# Patient Record
Sex: MALE | Race: WHITE | HISPANIC OR LATINO
[De-identification: names, ages, dates, MRNs, and addresses within clinical notes are randomized per-mention and may not be internally consistent; named-entity substitution may affect disease eponyms.]

---

## 2023-01-04 PROBLEM — Z00.00 ENCOUNTER FOR PREVENTIVE HEALTH EXAMINATION: Status: ACTIVE | Noted: 2023-01-04

## 2023-01-29 ENCOUNTER — NON-APPOINTMENT (OUTPATIENT)
Age: 83
End: 2023-01-29

## 2023-02-07 ENCOUNTER — LABORATORY RESULT (OUTPATIENT)
Age: 83
End: 2023-02-07

## 2023-02-07 ENCOUNTER — APPOINTMENT (OUTPATIENT)
Dept: GERIATRICS | Facility: CLINIC | Age: 83
End: 2023-02-07
Payer: MEDICARE

## 2023-02-07 VITALS
OXYGEN SATURATION: 96 % | TEMPERATURE: 97.8 F | WEIGHT: 151.5 LBS | HEIGHT: 67 IN | HEART RATE: 54 BPM | SYSTOLIC BLOOD PRESSURE: 138 MMHG | BODY MASS INDEX: 23.78 KG/M2 | DIASTOLIC BLOOD PRESSURE: 87 MMHG | RESPIRATION RATE: 16 BRPM

## 2023-02-07 DIAGNOSIS — R73.01 IMPAIRED FASTING GLUCOSE: ICD-10-CM

## 2023-02-07 DIAGNOSIS — R79.89 OTHER SPECIFIED ABNORMAL FINDINGS OF BLOOD CHEMISTRY: ICD-10-CM

## 2023-02-07 DIAGNOSIS — H40.9 UNSPECIFIED GLAUCOMA: ICD-10-CM

## 2023-02-07 PROCEDURE — 99204 OFFICE O/P NEW MOD 45 MIN: CPT

## 2023-02-07 RX ORDER — AMLODIPINE BESYLATE AND BENAZEPRIL HYDROCHLORIDE 10; 40 MG/1; MG/1
10-40 CAPSULE ORAL
Refills: 0 | Status: DISCONTINUED | COMMUNITY
Start: 2023-02-07 | End: 2023-02-07

## 2023-02-07 RX ORDER — HYDROCHLOROTHIAZIDE 12.5 MG/1
12.5 TABLET ORAL
Qty: 90 | Refills: 3 | Status: DISCONTINUED | COMMUNITY
Start: 2023-02-07 | End: 2023-02-07

## 2023-02-07 NOTE — HISTORY OF PRESENT ILLNESS
[No falls in past year] : Patient reported no falls in the past year [Completely Dependent] : Completely dependent. [0] : 2) Feeling down, depressed, or hopeless: Not at all (0) [FreeTextEntry1] : Mr. Rodríguez is a 81 yo M coming from home lives with his wife, family and has a HHA M-F 9hr and on weekend 7 hrs. Accompanied to current visit by his granddaughter Shelley [phone]. History obtained from pt and assisted by family.  Ambulates with a cane. 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 Complains of lower back ulcer family noticed by family 2 weeks ago, they are unsure of how long it has been there for. Family has not looked at it since and they are unsure if it has gotten better or worse. \par \par 2 years ago he was in the hospital due to worsening constipation, he had to be manually disimpacted. He now takes Mg citrate.\par \par He snacks frequently in the afternoon on sweet foods. \par \par He reported fell about 3 months ago, he feel on side walk he reported went say hello to a friend and he tripped over a fire hydrant and landed backwards and hurt his right hip. His granddaughter is unsure about this episode. \par \par Granddaughter helps with finances and doctors appointments. He has urine incontinence and wears diapers. His wife helps him getting dressed. Family also helps him with cooking with meals. \par \par Granddaughter helps with pill box. \par \par Vaccinations: PNA vaccine Prevnar 9/22/21 / Pneumovax 9/27/22. Flu shot 11/17/22. Shingles vaccine 2/4/2019 / 6/12/2019.\par Colonoscopy 10 years ago, WNL.\par \par Uncle had also dementia. And Aunt also had a stroke and dementia. Brother passed away in his 50's due to MI. Sister has CAD. Mother had cancer, pt is unsure of which type.

## 2023-02-07 NOTE — PHYSICAL EXAM
[Alert] : alert [No Acute Distress] : in no acute distress [Normal Outer Ear/Nose] : the ears and nose were normal in appearance [Normal Appearance] : the appearance of the neck was normal [Supple] : the neck was supple [No Respiratory Distress] : no respiratory distress [No Acc Muscle Use] : no accessory muscle use [Respiration, Rhythm And Depth] : normal respiratory rhythm and effort [Auscultation Breath Sounds / Voice Sounds] : lungs were clear to auscultation bilaterally [Heart Rate And Rhythm] : heart rate was normal and rhythm regular [Bowel Sounds] : normal bowel sounds [Abdomen Tenderness] : non-tender [Abdomen Soft] : soft [No Spinal Tenderness] : no spinal tenderness [Normal Color / Pigmentation] : normal skin color and pigmentation [Normal Turgor] : normal skin turgor [No Focal Deficits] : no focal deficits [Normal Affect] : the affect was normal [Normal Mood] : the mood was normal [de-identified] : left sided neck lipoma 5cms length x 3.5 cms wide

## 2023-02-07 NOTE — REASON FOR VISIT
[Initial Evaluation] : an initial evaluation [Family Member] : family member [FreeTextEntry1] : establish care

## 2023-02-08 LAB
25(OH)D3 SERPL-MCNC: 27.5 NG/ML
ALBUMIN SERPL ELPH-MCNC: 5 G/DL
ALP BLD-CCNC: 73 U/L
ALT SERPL-CCNC: 31 U/L
ANION GAP SERPL CALC-SCNC: 16 MMOL/L
APPEARANCE: CLEAR
AST SERPL-CCNC: 32 U/L
BASOPHILS # BLD AUTO: 0.05 K/UL
BASOPHILS NFR BLD AUTO: 0.9 %
BILIRUB SERPL-MCNC: 1.4 MG/DL
BILIRUBIN URINE: NEGATIVE
BLOOD URINE: NEGATIVE
BUN SERPL-MCNC: 15 MG/DL
CALCIUM SERPL-MCNC: 10.1 MG/DL
CHLORIDE SERPL-SCNC: 100 MMOL/L
CHOLEST SERPL-MCNC: 169 MG/DL
CO2 SERPL-SCNC: 26 MMOL/L
COLOR: YELLOW
CREAT SERPL-MCNC: 0.9 MG/DL
EGFR: 84 ML/MIN/1.73M2
EOSINOPHIL # BLD AUTO: 0.23 K/UL
EOSINOPHIL NFR BLD AUTO: 4.3 %
ESTIMATED AVERAGE GLUCOSE: 114 MG/DL
FOLATE SERPL-MCNC: >20 NG/ML
GLUCOSE QUALITATIVE U: NEGATIVE
GLUCOSE SERPL-MCNC: 73 MG/DL
HBA1C MFR BLD HPLC: 5.6 %
HCT VFR BLD CALC: 51.5 %
HDLC SERPL-MCNC: 77 MG/DL
HGB BLD-MCNC: 17.6 G/DL
IMM GRANULOCYTES NFR BLD AUTO: 0.2 %
KETONES URINE: NEGATIVE
LDLC SERPL CALC-MCNC: 62 MG/DL
LEUKOCYTE ESTERASE URINE: NEGATIVE
LYMPHOCYTES # BLD AUTO: 1.92 K/UL
LYMPHOCYTES NFR BLD AUTO: 35.5 %
MAN DIFF?: NORMAL
MCHC RBC-ENTMCNC: 32.7 PG
MCHC RBC-ENTMCNC: 34.2 GM/DL
MCV RBC AUTO: 95.5 FL
MONOCYTES # BLD AUTO: 0.72 K/UL
MONOCYTES NFR BLD AUTO: 13.3 %
NEUTROPHILS # BLD AUTO: 2.48 K/UL
NEUTROPHILS NFR BLD AUTO: 45.8 %
NITRITE URINE: NEGATIVE
NONHDLC SERPL-MCNC: 92 MG/DL
PH URINE: 6.5
PLATELET # BLD AUTO: 151 K/UL
POTASSIUM SERPL-SCNC: 4.5 MMOL/L
PROT SERPL-MCNC: 7.9 G/DL
PROTEIN URINE: ABNORMAL
RBC # BLD: 5.39 M/UL
RBC # FLD: 12.9 %
SODIUM SERPL-SCNC: 142 MMOL/L
SPECIFIC GRAVITY URINE: 1.02
TRIGL SERPL-MCNC: 152 MG/DL
TSH SERPL-ACNC: 4.1 UIU/ML
UROBILINOGEN URINE: NORMAL
VIT B12 SERPL-MCNC: 636 PG/ML
WBC # FLD AUTO: 5.41 K/UL

## 2023-02-19 ENCOUNTER — TRANSCRIPTION ENCOUNTER (OUTPATIENT)
Age: 83
End: 2023-02-19

## 2023-03-14 ENCOUNTER — APPOINTMENT (OUTPATIENT)
Dept: GERIATRICS | Facility: CLINIC | Age: 83
End: 2023-03-14

## 2023-03-20 ENCOUNTER — APPOINTMENT (OUTPATIENT)
Dept: GERIATRICS | Facility: CLINIC | Age: 83
End: 2023-03-20
Payer: MEDICARE

## 2023-03-20 VITALS
RESPIRATION RATE: 16 BRPM | TEMPERATURE: 98.4 F | WEIGHT: 157 LBS | OXYGEN SATURATION: 97 % | HEIGHT: 67 IN | BODY MASS INDEX: 24.64 KG/M2 | HEART RATE: 64 BPM

## 2023-03-20 VITALS — DIASTOLIC BLOOD PRESSURE: 106 MMHG | SYSTOLIC BLOOD PRESSURE: 178 MMHG

## 2023-03-20 PROCEDURE — 99214 OFFICE O/P EST MOD 30 MIN: CPT

## 2023-03-20 RX ORDER — ENALAPRIL MALEATE 10 MG/1
10 TABLET ORAL DAILY
Qty: 30 | Refills: 3 | Status: DISCONTINUED | COMMUNITY
Start: 2023-02-07 | End: 2023-03-20

## 2023-03-20 RX ORDER — AMLODIPINE BESYLATE AND BENAZEPRIL HYDROCHLORIDE 10; 40 MG/1; MG/1
10-40 CAPSULE ORAL
Qty: 30 | Refills: 3 | Status: DISCONTINUED | COMMUNITY
Start: 2023-03-20 | End: 2023-03-20

## 2023-03-20 NOTE — ASSESSMENT
[FreeTextEntry1] : -f/u in 1 month for BP monitoring, will need a cognitive assessment visit in the future. \par

## 2023-03-20 NOTE — PHYSICAL EXAM
[Alert] : alert [Normal Outer Ear/Nose] : the ears and nose were normal in appearance [Normal Appearance] : the appearance of the neck was normal [Supple] : the neck was supple [No Respiratory Distress] : no respiratory distress [No Acc Muscle Use] : no accessory muscle use [Respiration, Rhythm And Depth] : normal respiratory rhythm and effort [Auscultation Breath Sounds / Voice Sounds] : lungs were clear to auscultation bilaterally [Heart Rate And Rhythm] : heart rate was normal and rhythm regular [Bowel Sounds] : normal bowel sounds [Abdomen Tenderness] : non-tender [Abdomen Soft] : soft [No Spinal Tenderness] : no spinal tenderness [Normal Color / Pigmentation] : normal skin color and pigmentation [Normal Turgor] : normal skin turgor [No Focal Deficits] : no focal deficits [Normal Affect] : the affect was normal [Normal Mood] : the mood was normal [de-identified] : left sided neck lipoma 5cms length x 3.5 cms wide

## 2023-03-20 NOTE — HISTORY OF PRESENT ILLNESS
[No falls in past year] : Patient reported no falls in the past year [Completely Dependent] : Completely dependent. [0] : 2) Feeling down, depressed, or hopeless: Not at all (0) [FreeTextEntry1] : Mr. Rodríguez is a 81 yo M coming from home lives with his wife, family and has a HHA M-F 9hr and on weekend 7 hrs. Accompanied to current visit by his granddaughter Shelley [phone]. History obtained from pt and assisted by family.  Ambulates with a cane. 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 At urgent care SBP was in 200's and was sent to the ER at [facility], at that visit he was given Lotrel 10-40 with improvement of BP and was DC home. He was previously on Lotrel but reported has increase leg swelling and when he was started on low dose HCTZ he had urine incontinence, which may have aggravated sacral decubitus ulcer. \par \par 2 years ago he was in the hospital due to worsening constipation, he had to be manually disimpacted. He now takes Mg citrate.\par \par He snacks frequently in the afternoon on sweet foods. \par \par He reported fell about 3 months ago, he feel on side walk he reported went say hello to a friend and he tripped over a fire hydrant and landed backwards and hurt his right hip. His granddaughter is unsure about this episode. \par \par Granddaughter helps with finances and doctors appointments. He has urine incontinence and wears diapers. His wife helps him getting dressed. Family also helps him with cooking with meals. \par \par Granddaughter helps with pill box. \par \par Vaccinations: PNA vaccine Prevnar 9/22/21 / Pneumovax 9/27/22. Flu shot 11/17/22. Shingles vaccine 2/4/2019 / 6/12/2019.\par Colonoscopy 10 years ago, WNL.\par \par Uncle had also dementia. And Aunt also had a stroke and dementia. Brother passed away in his 50's due to MI. Sister has CAD. Mother had cancer, pt is unsure of which type.

## 2023-04-20 ENCOUNTER — APPOINTMENT (OUTPATIENT)
Dept: GERIATRICS | Facility: CLINIC | Age: 83
End: 2023-04-20
Payer: MEDICARE

## 2023-04-20 VITALS
RESPIRATION RATE: 16 BRPM | SYSTOLIC BLOOD PRESSURE: 132 MMHG | TEMPERATURE: 97.7 F | HEART RATE: 65 BPM | DIASTOLIC BLOOD PRESSURE: 80 MMHG | BODY MASS INDEX: 23.93 KG/M2 | HEIGHT: 67 IN | WEIGHT: 152.5 LBS | OXYGEN SATURATION: 95 %

## 2023-04-20 PROCEDURE — 99214 OFFICE O/P EST MOD 30 MIN: CPT

## 2023-04-20 NOTE — PHYSICAL EXAM
[Alert] : alert [Normal Outer Ear/Nose] : the ears and nose were normal in appearance [Normal Appearance] : the appearance of the neck was normal [Supple] : the neck was supple [No Respiratory Distress] : no respiratory distress [No Acc Muscle Use] : no accessory muscle use [Respiration, Rhythm And Depth] : normal respiratory rhythm and effort [Heart Rate And Rhythm] : heart rate was normal and rhythm regular [Auscultation Breath Sounds / Voice Sounds] : lungs were clear to auscultation bilaterally [Bowel Sounds] : normal bowel sounds [Abdomen Tenderness] : non-tender [Abdomen Soft] : soft [No Spinal Tenderness] : no spinal tenderness [Normal Color / Pigmentation] : normal skin color and pigmentation [Normal Turgor] : normal skin turgor [No Focal Deficits] : no focal deficits [Normal Affect] : the affect was normal [Normal Mood] : the mood was normal [de-identified] : left sided neck lipoma 5cms length x 3.5 cms wide

## 2023-04-20 NOTE — HISTORY OF PRESENT ILLNESS
[No falls in past year] : Patient reported no falls in the past year [Completely Dependent] : Completely dependent. [0] : 2) Feeling down, depressed, or hopeless: Not at all (0) [FreeTextEntry1] : Mr. Rodríguez is a 81 yo M coming from home lives with his wife, family and has a HHA M-F 9hr and on weekend 7 hrs. Accompanied to current visit by his granddaughter Shelley [phone]. History obtained from pt and assisted by family.  Ambulates with a cane. 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 They started to give patient Lotrel and developed LE swelling again and they started HCTZ. Today BP stable. \par \par 2 years ago he was in the hospital due to worsening constipation, he had to be manually disimpacted. He now takes Mg citrate.\par \par He snacks frequently in the afternoon on sweet foods. \par \par Granddaughter helps with finances and doctors appointments. He has urine incontinence and wears diapers. His wife helps him getting dressed. Family also helps him with cooking with meals. \par \par Granddaughter helps with pill box. \par \par Vaccinations: PNA vaccine Prevnar 9/22/21 / Pneumovax 9/27/22. Flu shot 11/17/22. Shingles vaccine 2/4/2019 / 6/12/2019.\par Colonoscopy 10 years ago, WNL.\par \par Uncle had also dementia. And Aunt also had a stroke and dementia. Brother passed away in his 50's due to MI. Sister has CAD. Mother had cancer, pt is unsure of which type.

## 2023-04-21 LAB
ALBUMIN SERPL ELPH-MCNC: 5.1 G/DL
ALP BLD-CCNC: 80 U/L
ALT SERPL-CCNC: 66 U/L
AST SERPL-CCNC: 44 U/L
BILIRUB DIRECT SERPL-MCNC: 0.3 MG/DL
BILIRUB INDIRECT SERPL-MCNC: 0.8 MG/DL
BILIRUB SERPL-MCNC: 1.1 MG/DL
PROT SERPL-MCNC: 7.9 G/DL

## 2023-05-10 ENCOUNTER — APPOINTMENT (OUTPATIENT)
Dept: GERIATRICS | Facility: CLINIC | Age: 83
End: 2023-05-10
Payer: MEDICARE

## 2023-05-10 VITALS
RESPIRATION RATE: 16 BRPM | DIASTOLIC BLOOD PRESSURE: 106 MMHG | BODY MASS INDEX: 24.4 KG/M2 | TEMPERATURE: 97.9 F | WEIGHT: 155.5 LBS | OXYGEN SATURATION: 96 % | HEART RATE: 55 BPM | HEIGHT: 67 IN | SYSTOLIC BLOOD PRESSURE: 192 MMHG

## 2023-05-10 PROCEDURE — 99483 ASSMT & CARE PLN PT COG IMP: CPT

## 2023-05-10 RX ORDER — LISINOPRIL 40 MG/1
40 TABLET ORAL DAILY
Qty: 90 | Refills: 3 | Status: DISCONTINUED | COMMUNITY
Start: 2023-03-20 | End: 2023-05-10

## 2023-06-15 ENCOUNTER — APPOINTMENT (OUTPATIENT)
Dept: GERIATRICS | Facility: CLINIC | Age: 83
End: 2023-06-15
Payer: MEDICARE

## 2023-06-15 VITALS
WEIGHT: 151.06 LBS | BODY MASS INDEX: 23.71 KG/M2 | TEMPERATURE: 98 F | DIASTOLIC BLOOD PRESSURE: 95 MMHG | RESPIRATION RATE: 14 BRPM | OXYGEN SATURATION: 96 % | HEIGHT: 67 IN | HEART RATE: 50 BPM | SYSTOLIC BLOOD PRESSURE: 158 MMHG

## 2023-06-15 DIAGNOSIS — K59.01 SLOW TRANSIT CONSTIPATION: ICD-10-CM

## 2023-06-15 PROCEDURE — 99214 OFFICE O/P EST MOD 30 MIN: CPT

## 2023-06-15 NOTE — HISTORY OF PRESENT ILLNESS
[No falls in past year] : Patient reported no falls in the past year [Completely Dependent] : Completely dependent. [0] : 2) Feeling down, depressed, or hopeless: Not at all (0) [FreeTextEntry1] : Mr. Rodríguez is a 81 yo M coming from home lives with his wife, family and has a HHA M-F 9hr and on weekend 7 hrs. Accompanied to current visit by his granddaughter Shelley [phone]. History obtained from pt and assisted by family.  Ambulates with a cane. 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 Family brought BP readings from home and BP has remained stable.\par \par He has upcoming appointment with neurologist for her Parkinson's disease. He continues to have unsteady gait despite using a cane. He sometimes needs to stop. Due to lower back and bilateral knee pain. \par \par He comes today for memory testing. He was dx with dementia as a consequence of Parkinsons disease and takes Donepezil 5mg daily. \par \par 2 years ago he was in the hospital due to worsening constipation, he had to be manually disimpacted. He now takes Colace and Dulcolax once a day.\par \par He snacks frequently in the afternoon on sweet foods. \par \par Granddaughter helps with finances and doctors appointments. He has urine incontinence and wears diapers. His wife helps him getting dressed. Family also helps him with cooking with meals. \par \par Granddaughter helps with pill box. \par \par Vaccinations: PNA vaccine Prevnar 9/22/21 / Pneumovax 9/27/22. Flu shot 11/17/22. Shingles vaccine 2/4/2019 / 6/12/2019.\par Colonoscopy 10 years ago, WNL.\par \par Uncle had also dementia. And Aunt also had a stroke and dementia. Brother passed away in his 50's due to MI. Sister has CAD. Mother had cancer, pt is unsure of which type.

## 2023-06-15 NOTE — ASSESSMENT
[FreeTextEntry1] : - requested daughter to bring prior HCP\par - will repeat blood work \par - pr wishes to change pharmacy, medication refill was sent \par - f/u in 3 months for influenza vaccine \par

## 2023-06-15 NOTE — PHYSICAL EXAM
[Alert] : alert [Normal Outer Ear/Nose] : the ears and nose were normal in appearance [Normal Appearance] : the appearance of the neck was normal [Supple] : the neck was supple [No Respiratory Distress] : no respiratory distress [No Acc Muscle Use] : no accessory muscle use [Respiration, Rhythm And Depth] : normal respiratory rhythm and effort [Auscultation Breath Sounds / Voice Sounds] : lungs were clear to auscultation bilaterally [Heart Rate And Rhythm] : heart rate was normal and rhythm regular [Bowel Sounds] : normal bowel sounds [Abdomen Tenderness] : non-tender [Abdomen Soft] : soft [No Spinal Tenderness] : no spinal tenderness [Normal Color / Pigmentation] : normal skin color and pigmentation [Normal Turgor] : normal skin turgor [No Focal Deficits] : no focal deficits [Normal Affect] : the affect was normal [Normal Mood] : the mood was normal [de-identified] : left sided neck lipoma 5cms length x 3.5 cms wide [MentalStatusTotal] : MMSE score 21/30 (test conducted in Maltese) abnormal clock

## 2023-06-16 DIAGNOSIS — R79.89 OTHER SPECIFIED ABNORMAL FINDINGS OF BLOOD CHEMISTRY: ICD-10-CM

## 2023-06-16 DIAGNOSIS — R17 UNSPECIFIED JAUNDICE: ICD-10-CM

## 2023-06-16 LAB
25(OH)D3 SERPL-MCNC: 31.9 NG/ML
ALBUMIN SERPL ELPH-MCNC: 4.8 G/DL
ALP BLD-CCNC: 65 U/L
ALT SERPL-CCNC: 18 U/L
ANION GAP SERPL CALC-SCNC: 14 MMOL/L
AST SERPL-CCNC: 36 U/L
BILIRUB SERPL-MCNC: 1.5 MG/DL
BUN SERPL-MCNC: 16 MG/DL
CALCIUM SERPL-MCNC: 10.3 MG/DL
CHLORIDE SERPL-SCNC: 106 MMOL/L
CO2 SERPL-SCNC: 25 MMOL/L
CREAT SERPL-MCNC: 1.01 MG/DL
EGFR: 73 ML/MIN/1.73M2
GLUCOSE SERPL-MCNC: 79 MG/DL
POTASSIUM SERPL-SCNC: 5.4 MMOL/L
PROT SERPL-MCNC: 7.4 G/DL
SODIUM SERPL-SCNC: 146 MMOL/L

## 2023-06-20 ENCOUNTER — APPOINTMENT (OUTPATIENT)
Dept: NEUROLOGY | Facility: CLINIC | Age: 83
End: 2023-06-20
Payer: MEDICARE

## 2023-06-20 VITALS — DIASTOLIC BLOOD PRESSURE: 102 MMHG | HEART RATE: 55 BPM | SYSTOLIC BLOOD PRESSURE: 160 MMHG

## 2023-06-20 VITALS
BODY MASS INDEX: 23.7 KG/M2 | HEART RATE: 50 BPM | WEIGHT: 151 LBS | HEIGHT: 67 IN | SYSTOLIC BLOOD PRESSURE: 155 MMHG | DIASTOLIC BLOOD PRESSURE: 91 MMHG

## 2023-06-20 DIAGNOSIS — G45.9 TRANSIENT CEREBRAL ISCHEMIC ATTACK, UNSPECIFIED: ICD-10-CM

## 2023-06-20 DIAGNOSIS — R41.3 OTHER AMNESIA: ICD-10-CM

## 2023-06-20 PROCEDURE — 99205 OFFICE O/P NEW HI 60 MIN: CPT

## 2023-06-20 NOTE — PHYSICAL EXAM
[FreeTextEntry1] : On exam patient is awake and alert.  Pleasant cooperative with the exam.\par Facial expression minimally reduced\par Speech is soft\par Extraocular movements are intact\par Strength 5/5\par \par Resting tremor\par Grade 1 bilaterally right more prominent than the left\par Bilateral bradykinesia\par Hand opening and closing 2 on the right 1 on the left\par Rapid alternating movements 1 on the right normal on the left\par Finger taps 1 bilaterally right is slightly slower\par No difficulty getting up from the chair\par Reduced right arm swing while walking\par Pull test is negative

## 2023-06-20 NOTE — HISTORY OF PRESENT ILLNESS
[FreeTextEntry1] : This is an 82-year-old right-handed male who presents with chief complaints of Parkinson's disease.  Patient wishes to establish care here.  At this visit he is accompanied by his granddaughter Maritza.  History is obtained from both of them and from review of records\par \par Patient was diagnosed with Parkinson's disease 2 years ago when he developed right hand tremors and shuffling gait.  He was initiated on Sinemet half a tablet twice a day there has been some reduction in tremor although tremor still persist.\par He denies having any falls\par He has moderate loss of short-term memory without any hallucinations.  He is on donepezil 5 mg at bedtime\par He has some dream enactment where he is picking things off of trees.\par Sleep is overall okay sometimes he wakes up early in the morning\par No difficulty swallowing\par He endorses drooling constipation.  He has urinary incontinence for over 5 years\par Mood is sometimes off he does not wish to participate in social activities but for the most part does okay\par He needs help in ADLs such as dressing eating etc.\par \par Review of systems a complete review of systems is performed and is negative except as listed in HPI\par Past medical history\par Hypertension, TIA in July 2019 he is on aspirin.\par Wet macular degeneration\par Pseudophakia\par Retinal detachment\par Osteoarthritis

## 2023-06-20 NOTE — DISCUSSION/SUMMARY
[FreeTextEntry1] : This is an 82-year-old right-handed male who presents with chief complaints of Parkinson's disease.  On exam noted to have bilateral resting tremor and bradykinesia right worse than left.  Also has history of short-term memory loss and urinary incontinence.  Had a TIA in 2019\par Currently on Sinemet 25/100, half a tablet twice a day denies any side effects on Sinemet\par \par Impression-parkinsonism, reports good benefit on Sinemet.  Has history of short-term memory loss and urinary incontinence\par \par Plan\par Gently increase Sinemet from half a tablet twice a day to 1 tablet twice a day, will monitor for any wearing off and may add a third dose if needed\par Physical therapy and speech therapy prescriptions provided\par Advised to take donepezil in the morning, may help reduce dream enactment, if dream enactment persists advised to start melatonin 3 mg at bedtime\par Follow-up in 3 months\par All questions addressed and answered

## 2023-07-06 ENCOUNTER — APPOINTMENT (OUTPATIENT)
Dept: DERMATOLOGY | Facility: CLINIC | Age: 83
End: 2023-07-06
Payer: MEDICARE

## 2023-07-06 DIAGNOSIS — D36.9 BENIGN NEOPLASM, UNSPECIFIED SITE: ICD-10-CM

## 2023-07-06 DIAGNOSIS — D69.2 OTHER NONTHROMBOCYTOPENIC PURPURA: ICD-10-CM

## 2023-07-06 DIAGNOSIS — D17.9 BENIGN LIPOMATOUS NEOPLASM, UNSPECIFIED: ICD-10-CM

## 2023-07-06 PROCEDURE — 99203 OFFICE O/P NEW LOW 30 MIN: CPT | Mod: GC

## 2023-07-07 PROBLEM — D69.2 PURPURA: Status: ACTIVE | Noted: 2023-07-07

## 2023-07-07 PROBLEM — D17.9 LIPOMA: Status: ACTIVE | Noted: 2023-07-07

## 2023-07-07 NOTE — ASSESSMENT
[FreeTextEntry1] : 1) Lipoma, chin\par - favored, though EIC is a possibility\par - Counseled about clinically benign lesion\par - Discussed excision for treatment/diagnosis\par - Reviewed risks (as well as mitigation strategies for adverse drug events), benefits, and alternatives of therapy \par - Pt declines treatment at this time \par \par 2) Ecchymosis, right knee\par - 2/2 mild trauma\par - Counseled about clinically benign lesion\par - No treatment indicated at this time \par \par RTC prn

## 2023-07-07 NOTE — PHYSICAL EXAM
[FreeTextEntry3] : soft, mobile, nontender skin-colored nodule on L submandibular chin\par ecchymosis on right knee

## 2023-07-07 NOTE — HISTORY OF PRESENT ILLNESS
[FreeTextEntry1] : CC: bump on neck [de-identified] : Mr. Rodríguez is an 82yoM, Slovenian speaking, presenting for evaluation of bump on left neck.  Pt accompanied by grand daughter who is helping translate (declined formal  services). She states that the grandmother (patient's wife) noticed a bump on left neck that began 5 year ago without an initiating event. It continued to grow. No intervention was initiated. It does not bother the patient, only the wife noticed and recommended that he be evaluated. also has an area on the right knee that occurred after an injury.\par \par PMH: Parkinsons, essential htn, DM

## 2023-07-20 ENCOUNTER — APPOINTMENT (OUTPATIENT)
Dept: GERIATRICS | Facility: CLINIC | Age: 83
End: 2023-07-20

## 2023-09-14 ENCOUNTER — APPOINTMENT (OUTPATIENT)
Dept: GERIATRICS | Facility: CLINIC | Age: 83
End: 2023-09-14
Payer: MEDICARE

## 2023-09-14 VITALS — DIASTOLIC BLOOD PRESSURE: 81 MMHG | SYSTOLIC BLOOD PRESSURE: 138 MMHG

## 2023-09-14 VITALS
HEART RATE: 53 BPM | SYSTOLIC BLOOD PRESSURE: 142 MMHG | RESPIRATION RATE: 15 BRPM | DIASTOLIC BLOOD PRESSURE: 92 MMHG | WEIGHT: 143 LBS | TEMPERATURE: 98 F | BODY MASS INDEX: 22.4 KG/M2 | OXYGEN SATURATION: 99 %

## 2023-09-14 DIAGNOSIS — R53.81 OTHER MALAISE: ICD-10-CM

## 2023-09-14 DIAGNOSIS — M25.50 PAIN IN UNSPECIFIED JOINT: ICD-10-CM

## 2023-09-14 DIAGNOSIS — D69.6 THROMBOCYTOPENIA, UNSPECIFIED: ICD-10-CM

## 2023-09-14 PROCEDURE — 99214 OFFICE O/P EST MOD 30 MIN: CPT

## 2023-09-14 RX ORDER — ASCORBIC ACID 125 MG
125 TABLET,CHEWABLE ORAL
Refills: 0 | Status: DISCONTINUED | COMMUNITY
Start: 2023-02-07 | End: 2023-09-14

## 2023-09-14 RX ORDER — MELOXICAM 7.5 MG/1
7.5 TABLET ORAL
Qty: 30 | Refills: 0 | Status: DISCONTINUED | COMMUNITY
Start: 2023-09-14 | End: 2023-09-14

## 2023-09-15 LAB
HCT VFR BLD CALC: 45.4 %
HGB BLD-MCNC: 15.5 G/DL
MCHC RBC-ENTMCNC: 32.5 PG
MCHC RBC-ENTMCNC: 34.1 GM/DL
MCV RBC AUTO: 95.2 FL
PLATELET # BLD AUTO: 155 K/UL
RBC # BLD: 4.77 M/UL
RBC # FLD: 12.6 %
WBC # FLD AUTO: 6.11 K/UL

## 2023-09-21 ENCOUNTER — APPOINTMENT (OUTPATIENT)
Dept: GERIATRICS | Facility: CLINIC | Age: 83
End: 2023-09-21
Payer: MEDICARE

## 2023-09-21 ENCOUNTER — NON-APPOINTMENT (OUTPATIENT)
Age: 83
End: 2023-09-21

## 2023-09-21 ENCOUNTER — APPOINTMENT (OUTPATIENT)
Dept: OTOLARYNGOLOGY | Facility: CLINIC | Age: 83
End: 2023-09-21
Payer: MEDICARE

## 2023-09-21 VITALS
TEMPERATURE: 98.7 F | HEIGHT: 67 IN | WEIGHT: 143 LBS | BODY MASS INDEX: 22.44 KG/M2 | HEART RATE: 50 BPM | SYSTOLIC BLOOD PRESSURE: 161 MMHG | DIASTOLIC BLOOD PRESSURE: 88 MMHG

## 2023-09-21 VITALS — SYSTOLIC BLOOD PRESSURE: 142 MMHG | DIASTOLIC BLOOD PRESSURE: 80 MMHG
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
## 2023-09-21 DIAGNOSIS — L85.3 XEROSIS CUTIS: ICD-10-CM

## 2023-09-21 DIAGNOSIS — T07.XXXA UNSPECIFIED MULTIPLE INJURIES, INITIAL ENCOUNTER: ICD-10-CM

## 2023-09-21 PROCEDURE — 99213 OFFICE O/P EST LOW 20 MIN: CPT | Mod: 25

## 2023-09-21 PROCEDURE — 31237 NSL/SINS NDSC SURG BX POLYPC: CPT | Mod: RT

## 2023-09-21 PROCEDURE — 99203 OFFICE O/P NEW LOW 30 MIN: CPT | Mod: 25

## 2023-09-22 ENCOUNTER — TRANSCRIPTION ENCOUNTER (OUTPATIENT)
Age: 83
End: 2023-09-22

## 2023-09-26 ENCOUNTER — TRANSCRIPTION ENCOUNTER (OUTPATIENT)
Age: 83
End: 2023-09-26

## 2023-09-27 ENCOUNTER — NON-APPOINTMENT (OUTPATIENT)
Age: 83
End: 2023-09-27

## 2023-09-28 ENCOUNTER — NON-APPOINTMENT (OUTPATIENT)
Age: 83
End: 2023-09-28

## 2023-09-28 LAB — CORE LAB BIOPSY: NORMAL

## 2023-10-05 ENCOUNTER — APPOINTMENT (OUTPATIENT)
Dept: OTOLARYNGOLOGY | Facility: CLINIC | Age: 83
End: 2023-10-05
Payer: MEDICARE

## 2023-10-05 VITALS
DIASTOLIC BLOOD PRESSURE: 80 MMHG | HEART RATE: 57 BPM | TEMPERATURE: 98 F | BODY MASS INDEX: 22.44 KG/M2 | WEIGHT: 143 LBS | SYSTOLIC BLOOD PRESSURE: 132 MMHG | HEIGHT: 67 IN

## 2023-10-05 PROCEDURE — 99213 OFFICE O/P EST LOW 20 MIN: CPT | Mod: 25

## 2023-10-05 PROCEDURE — 31237 NSL/SINS NDSC SURG BX POLYPC: CPT | Mod: RT

## 2023-10-24 ENCOUNTER — APPOINTMENT (OUTPATIENT)
Dept: NEUROLOGY | Facility: CLINIC | Age: 83
End: 2023-10-24
Payer: MEDICARE

## 2023-10-24 VITALS
SYSTOLIC BLOOD PRESSURE: 132 MMHG | DIASTOLIC BLOOD PRESSURE: 85 MMHG | HEART RATE: 60 BPM | HEIGHT: 67 IN | WEIGHT: 143 LBS | BODY MASS INDEX: 22.44 KG/M2

## 2023-10-24 PROCEDURE — 99214 OFFICE O/P EST MOD 30 MIN: CPT

## 2023-11-02 ENCOUNTER — APPOINTMENT (OUTPATIENT)
Dept: OTOLARYNGOLOGY | Facility: CLINIC | Age: 83
End: 2023-11-02
Payer: MEDICARE

## 2023-11-02 VITALS
BODY MASS INDEX: 22.44 KG/M2 | HEART RATE: 51 BPM | DIASTOLIC BLOOD PRESSURE: 95 MMHG | SYSTOLIC BLOOD PRESSURE: 146 MMHG | HEIGHT: 67 IN | WEIGHT: 143 LBS | TEMPERATURE: 96.5 F

## 2023-11-02 PROCEDURE — 31237 NSL/SINS NDSC SURG BX POLYPC: CPT | Mod: RT

## 2023-11-02 PROCEDURE — 99213 OFFICE O/P EST LOW 20 MIN: CPT | Mod: 25

## 2023-11-03 ENCOUNTER — TRANSCRIPTION ENCOUNTER (OUTPATIENT)
Age: 83
End: 2023-11-03

## 2023-11-21 ENCOUNTER — APPOINTMENT (OUTPATIENT)
Dept: GERIATRICS | Facility: CLINIC | Age: 83
End: 2023-11-21
Payer: MEDICARE

## 2023-11-21 VITALS
WEIGHT: 147 LBS | DIASTOLIC BLOOD PRESSURE: 95 MMHG | BODY MASS INDEX: 23.02 KG/M2 | OXYGEN SATURATION: 98 % | SYSTOLIC BLOOD PRESSURE: 170 MMHG | HEART RATE: 57 BPM | TEMPERATURE: 97.8 F | RESPIRATION RATE: 15 BRPM

## 2023-11-21 DIAGNOSIS — Z23 ENCOUNTER FOR IMMUNIZATION: ICD-10-CM

## 2023-11-21 DIAGNOSIS — L02.214 CUTANEOUS ABSCESS OF GROIN: ICD-10-CM

## 2023-11-21 PROCEDURE — 99214 OFFICE O/P EST MOD 30 MIN: CPT

## 2023-11-22 LAB
ALBUMIN SERPL ELPH-MCNC: 4.7 G/DL
ALP BLD-CCNC: 85 U/L
ALT SERPL-CCNC: 15 U/L
ANION GAP SERPL CALC-SCNC: 12 MMOL/L
AST SERPL-CCNC: 25 U/L
BILIRUB SERPL-MCNC: 0.8 MG/DL
BUN SERPL-MCNC: 13 MG/DL
CALCIUM SERPL-MCNC: 9.8 MG/DL
CHLORIDE SERPL-SCNC: 104 MMOL/L
CHOLEST SERPL-MCNC: 140 MG/DL
CO2 SERPL-SCNC: 26 MMOL/L
CREAT SERPL-MCNC: 0.86 MG/DL
EGFR: 85 ML/MIN/1.73M2
ESTIMATED AVERAGE GLUCOSE: 108 MG/DL
GLUCOSE SERPL-MCNC: 82 MG/DL
HBA1C MFR BLD HPLC: 5.4 %
HCT VFR BLD CALC: 45.2 %
HDLC SERPL-MCNC: 67 MG/DL
HGB BLD-MCNC: 15 G/DL
LDLC SERPL CALC-MCNC: 54 MG/DL
MCHC RBC-ENTMCNC: 31.7 PG
MCHC RBC-ENTMCNC: 33.2 GM/DL
MCV RBC AUTO: 95.6 FL
NONHDLC SERPL-MCNC: 73 MG/DL
PLATELET # BLD AUTO: 156 K/UL
POTASSIUM SERPL-SCNC: 4.9 MMOL/L
PROT SERPL-MCNC: 7.5 G/DL
RBC # BLD: 4.73 M/UL
RBC # FLD: 12.7 %
SODIUM SERPL-SCNC: 141 MMOL/L
TRIGL SERPL-MCNC: 106 MG/DL
WBC # FLD AUTO: 6.15 K/UL

## 2024-01-05 ENCOUNTER — TRANSCRIPTION ENCOUNTER (OUTPATIENT)
Age: 84
End: 2024-01-05

## 2024-01-05 RX ORDER — ATORVASTATIN CALCIUM 20 MG/1
20 TABLET, FILM COATED ORAL
Qty: 90 | Refills: 3 | Status: ACTIVE | COMMUNITY
Start: 2023-02-07 | End: 1900-01-01

## 2024-01-05 RX ORDER — AMOXICILLIN AND CLAVULANATE POTASSIUM 500; 125 MG/1; MG/1
500-125 TABLET, FILM COATED ORAL
Qty: 14 | Refills: 0 | Status: COMPLETED | COMMUNITY
Start: 2023-11-21 | End: 2024-01-05

## 2024-01-05 RX ORDER — DORZOLAMIDE HYDROCHLORIDE TIMOLOL MALEATE 20; 5 MG/ML; MG/ML
2-0.5 SOLUTION/ DROPS OPHTHALMIC TWICE DAILY
Qty: 1 | Refills: 3 | Status: ACTIVE | COMMUNITY
Start: 2023-02-07 | End: 1900-01-01

## 2024-01-05 RX ORDER — DONEPEZIL HYDROCHLORIDE 10 MG/1
10 TABLET ORAL
Qty: 90 | Refills: 3 | Status: ACTIVE | COMMUNITY
Start: 2023-02-07 | End: 1900-01-01

## 2024-01-05 RX ORDER — ASPIRIN 81 MG/1
81 TABLET, COATED ORAL
Qty: 90 | Refills: 3 | Status: ACTIVE | COMMUNITY
Start: 2023-02-07 | End: 1900-01-01

## 2024-01-08 ENCOUNTER — APPOINTMENT (OUTPATIENT)
Dept: OTOLARYNGOLOGY | Facility: CLINIC | Age: 84
End: 2024-01-08
Payer: MEDICARE

## 2024-01-08 VITALS
DIASTOLIC BLOOD PRESSURE: 83 MMHG | SYSTOLIC BLOOD PRESSURE: 148 MMHG | HEIGHT: 67 IN | TEMPERATURE: 98.4 F | HEART RATE: 68 BPM | BODY MASS INDEX: 23.07 KG/M2 | WEIGHT: 147 LBS

## 2024-01-08 PROCEDURE — 31237 NSL/SINS NDSC SURG BX POLYPC: CPT | Mod: RT

## 2024-01-08 PROCEDURE — 99213 OFFICE O/P EST LOW 20 MIN: CPT | Mod: 25

## 2024-01-24 ENCOUNTER — APPOINTMENT (OUTPATIENT)
Dept: WOUND CARE | Facility: CLINIC | Age: 84
End: 2024-01-24

## 2024-01-25 ENCOUNTER — TRANSCRIPTION ENCOUNTER (OUTPATIENT)
Age: 84
End: 2024-01-25

## 2024-01-25 RX ORDER — FAMOTIDINE 20 MG/1
20 TABLET, FILM COATED ORAL DAILY
Qty: 90 | Refills: 3 | Status: ACTIVE | COMMUNITY
Start: 2024-01-25 | End: 1900-01-01

## 2024-01-26 ENCOUNTER — APPOINTMENT (OUTPATIENT)
Dept: WOUND CARE | Facility: CLINIC | Age: 84
End: 2024-01-26
Payer: MEDICARE

## 2024-01-26 ENCOUNTER — OUTPATIENT (OUTPATIENT)
Dept: OUTPATIENT SERVICES | Facility: HOSPITAL | Age: 84
LOS: 1 days | End: 2024-01-26
Payer: MEDICARE

## 2024-01-26 VITALS — DIASTOLIC BLOOD PRESSURE: 102 MMHG | TEMPERATURE: 97.2 F | HEART RATE: 59 BPM | SYSTOLIC BLOOD PRESSURE: 191 MMHG

## 2024-01-26 VITALS
HEART RATE: 57 BPM | HEIGHT: 67 IN | WEIGHT: 147 LBS | DIASTOLIC BLOOD PRESSURE: 106 MMHG | TEMPERATURE: 97.3 F | BODY MASS INDEX: 23.07 KG/M2 | SYSTOLIC BLOOD PRESSURE: 196 MMHG

## 2024-01-26 PROCEDURE — 99205 OFFICE O/P NEW HI 60 MIN: CPT

## 2024-01-26 PROCEDURE — G0463: CPT

## 2024-01-26 RX ORDER — ACETAMINOPHEN 650 MG/1
650 TABLET, EXTENDED RELEASE ORAL 3 TIMES DAILY
Qty: 1 | Refills: 1 | Status: DISCONTINUED | COMMUNITY
Start: 2023-09-14 | End: 2024-01-26

## 2024-01-26 RX ORDER — MV-MIN/FOLIC/K1/LYCOPEN/LUTEIN 300-60 MCG
TABLET ORAL DAILY
Qty: 90 | Refills: 0 | Status: DISCONTINUED | COMMUNITY
Start: 2023-02-07 | End: 2024-01-26

## 2024-01-26 NOTE — PLAN
[FreeTextEntry1] : 1/26/24 Plan - culture obtained, will call with results scrotal wound - may apply warm soaks, few times/day, also can clean with witch hazel to help with drying and keep clean after showering.  Cover with gauze stop using Mongolian spring - advised to buy dove liquid soap cotton underwear against skin follow up 2-3  weeks

## 2024-01-26 NOTE — REASON FOR VISIT
[Consultation] : a consultation visit [Family Member] : family member [FreeTextEntry1] : groin wound

## 2024-01-26 NOTE — ASSESSMENT
[FreeTextEntry1] : 1-26-24: 84 y/o presents with granddaughter, pt complaining of pain in left scrotal area, started few weeks ago, has right groin and left buttock wounds which were painful but not currently lives at home with wife denies any recent hospitalizations, rehab stays, etc. washes with Guinean spring soap, linens, towel are laundered left lateral Scrotal area with raised, indurated white head , expressed purulence, culture obtained , c/o pain. s/p mechanical debridement  right groin - resolved, tiny bit of induration, lesion closed and dry, denies pain left buttock with dry, closed lesion, also tiny bit of induration, denies pain BP high today - recently had amlodipine added to meds, grand daughter has not picked up yet    PMH parkinsons, HTN, TIA

## 2024-01-26 NOTE — HISTORY OF PRESENT ILLNESS
[FreeTextEntry1] : 82 y/o presents with aaron, pt complaining of pain in left scrotal area, started few weeks ago, has right groin and left buttock wounds which were painful but not currently lives at home with wife denies any recent hospitalizations, rehab stays, etc. washes with jackelyn spring soap, linens, towel are laundered left lateral Scrotal area with raised, indurated white head , expressed purulence, culture obtained , c/o pain right groin - resolved, tiny bit of induration, lesion closed and dry, denies pain left buttock with dry, closed lesion, also tiny bit of induration, denies pain BP high today - recently had amlodipine added to meds, grand daughter has not picked up yet    PMH parkinsons, HTN, TIA

## 2024-01-26 NOTE — PHYSICAL EXAM
[Ankle Swelling Bilaterally] : bilaterally  [Skin Ulcer] : ulcer [Skin Induration] : induration [Alert] : alert [Oriented to Person] : oriented to person [Oriented to Place] : oriented to place [Oriented to Time] : oriented to time [Calm] : calm [Please See PDF for Tissue Analytics] : Please See PDF for [employer] Analytics. [Ankle Swelling (On Exam)] : not present [de-identified] : pleasant, NAD [de-identified] : supple [de-identified] : equal chest rise [de-identified] : soft NT [de-identified] : incontinence [de-identified] : walks with cane for short distance, weak - arms/legs

## 2024-01-29 LAB — BACTERIA SPEC CULT: ABNORMAL

## 2024-02-01 DIAGNOSIS — S31.30XA UNSPECIFIED OPEN WOUND OF SCROTUM AND TESTES, INITIAL ENCOUNTER: ICD-10-CM
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
## 2024-02-06 ENCOUNTER — OUTPATIENT (OUTPATIENT)
Dept: OUTPATIENT SERVICES | Facility: HOSPITAL | Age: 84
LOS: 1 days | End: 2024-02-06
Payer: MEDICARE

## 2024-02-06 ENCOUNTER — APPOINTMENT (OUTPATIENT)
Dept: WOUND CARE | Facility: HOSPITAL | Age: 84
End: 2024-02-06
Payer: MEDICARE

## 2024-02-06 VITALS — TEMPERATURE: 97.3 F

## 2024-02-06 VITALS — SYSTOLIC BLOOD PRESSURE: 178 MMHG | DIASTOLIC BLOOD PRESSURE: 99 MMHG | HEART RATE: 56 BPM

## 2024-02-06 DIAGNOSIS — Z87.828 PERSONAL HISTORY OF OTHER (HEALED) PHYSICAL INJURY AND TRAUMA: ICD-10-CM

## 2024-02-06 DIAGNOSIS — S31.30XA UNSPECIFIED OPEN WOUND OF SCROTUM AND TESTES, INITIAL ENCOUNTER: ICD-10-CM

## 2024-02-06 PROCEDURE — 99214 OFFICE O/P EST MOD 30 MIN: CPT

## 2024-02-06 PROCEDURE — G0463: CPT

## 2024-02-06 NOTE — HISTORY OF PRESENT ILLNESS
[FreeTextEntry1] : 84 y/o presents with aaron, pt complaining of pain in left scrotal area, started few weeks ago, has right groin and left buttock wounds which were painful but not currently lives at home with wife denies any recent hospitalizations, rehab stays, etc. washes with jackelyn spring soap, linens, towel are laundered left lateral Scrotal area with raised, indurated white head , expressed purulence, culture obtained , c/o pain right groin - resolved, tiny bit of induration, lesion closed and dry, denies pain left buttock with dry, closed lesion, also tiny bit of induration, denies pain BP high today - recently had amlodipine added to meds, grand daughter has not picked up yet    PMH parkinsons, HTN, TIA

## 2024-02-06 NOTE — REASON FOR VISIT
[Follow-Up: _____] : a [unfilled] follow-up visit [Family Member] : family member [FreeTextEntry1] : groin wound

## 2024-02-06 NOTE — PHYSICAL EXAM
[Ankle Swelling (On Exam)] : not present [Ankle Swelling Bilaterally] : bilaterally  [Skin Ulcer] : no ulcer [Skin Induration] : induration [Alert] : alert [Oriented to Person] : oriented to person [Oriented to Place] : oriented to place [Oriented to Time] : oriented to time [Calm] : calm [de-identified] : pleasant, NAD [de-identified] : AT [de-identified] : supple [de-identified] : equal chest rise [de-identified] : soft NT [de-identified] : incontinence [de-identified] : walks with cane for short distance, weak - arms/legs [Please See PDF for Tissue Analytics] : Please See PDF for [employer] Analytics.

## 2024-02-06 NOTE — PLAN
[FreeTextEntry1] : 1/26/24 Plan - culture obtained, will call with results scrotal wound - may apply warm soaks, few times/day, also can clean with witch hazel to help with drying and keep clean after showering.  Cover with gauze stop using Danish spring - advised to buy dove liquid soap cotton underwear against skin follow up 2-3  weeks  2-6-24: Plan: Scrotal wound resolved.  MAD developing-- wear cotton underwear wit liner if needed for incontinence.  No diaper.  D/w granddaughter the need to call pt's cardiologist today given pt's BP still elevated despite new med added.  Granddaughter expressed understanding.  Pt w/o complaints at end of exam. .  Walked out of  office un assisted with HHA.  Pt A&O x 3.  No n/v/f/c/ha/chest pain/arm pain/blurry vision Granddaughter instructed to take pt to ed should symptoms develop related to BP (n/v/f/c/ha/chest pain/arm pain/blurry vision) The patient was counseled as to the signs and symptoms of infection, and instructed in the event they suspect new or worsening infection or if the patient is significantly ill (fever, altered mental status, etc.), to present to the nearest ED.  f/u prn

## 2024-02-06 NOTE — REVIEW OF SYSTEMS
[Skin Wound] : no skin wound [As Noted in HPI] : as noted in HPI [Negative] : Endocrine [FreeTextEntry3] : glaucoma with surgery [FreeTextEntry4] : nasal polyp [FreeTextEntry9] : FROM [FreeTextEntry7] : takes dulcolax [de-identified] : parkinsons since 3/2017

## 2024-02-06 NOTE — ASSESSMENT
[FreeTextEntry1] : 1-26-24: 82 y/o presents with granddaughter, pt complaining of pain in left scrotal area, started few weeks ago, has right groin and left buttock wounds which were painful but not currently lives at home with wife denies any recent hospitalizations, rehab stays, etc. washes with [place] spring soap, linens, towel are laundered left lateral Scrotal area with raised, indurated white head , expressed purulence, culture obtained , c/o pain. s/p mechanical debridement  right groin - resolved, tiny bit of induration, lesion closed and dry, denies pain left buttock with dry, closed lesion, also tiny bit of induration, denies pain BP high today - recently had amlodipine added to meds, grand daughter has not picked up yet    PMH parkinsons, HTN, TIA  2-6-24: Pt here for f/u Accompanied by HHA.  Kelsi on HHA phone as  No new complaints Completed abx. Has been on new BP med since last week. (amlodipine) BP today 178/99.  Pt w/o complaints.  Walked into office un assisted.with HHA.  Pt A&O x 3.  No n/v/f/c/ha/chest pain/arm pain/blurry vision  On exam:  scrotal wound resolved. No s/s of infection.  MAD developing in left groin.  Pt wearing a diaper.

## 2024-02-09 ENCOUNTER — LABORATORY RESULT (OUTPATIENT)
Age: 84
End: 2024-02-09

## 2024-02-09 ENCOUNTER — APPOINTMENT (OUTPATIENT)
Dept: CARDIOLOGY | Facility: CLINIC | Age: 84
End: 2024-02-09
Payer: MEDICARE

## 2024-02-09 ENCOUNTER — NON-APPOINTMENT (OUTPATIENT)
Age: 84
End: 2024-02-09

## 2024-02-09 VITALS
WEIGHT: 147 LBS | DIASTOLIC BLOOD PRESSURE: 93 MMHG | OXYGEN SATURATION: 77 % | BODY MASS INDEX: 23.07 KG/M2 | HEART RATE: 68 BPM | HEIGHT: 67 IN | RESPIRATION RATE: 15 BRPM | SYSTOLIC BLOOD PRESSURE: 149 MMHG | TEMPERATURE: 97.7 F

## 2024-02-09 DIAGNOSIS — I10 ESSENTIAL (PRIMARY) HYPERTENSION: ICD-10-CM

## 2024-02-09 DIAGNOSIS — Z87.828 PERSONAL HISTORY OF OTHER (HEALED) PHYSICAL INJURY AND TRAUMA: ICD-10-CM
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
## 2024-02-09 DIAGNOSIS — S31.30XA UNSPECIFIED OPEN WOUND OF SCROTUM AND TESTES, INITIAL ENCOUNTER: ICD-10-CM
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
## 2024-02-09 PROCEDURE — 93000 ELECTROCARDIOGRAM COMPLETE: CPT

## 2024-02-09 PROCEDURE — 99204 OFFICE O/P NEW MOD 45 MIN: CPT | Mod: 25

## 2024-02-09 PROCEDURE — G2211 COMPLEX E/M VISIT ADD ON: CPT | Mod: NC,1L

## 2024-02-09 NOTE — PHYSICAL EXAM
[Well Developed] : well developed [Well Nourished] : well nourished [No Acute Distress] : no acute distress [Normal Conjunctiva] : normal conjunctiva [Normal Venous Pressure] : normal venous pressure [No Carotid Bruit] : no carotid bruit [Normal S1, S2] : normal S1, S2 [No Rub] : no rub [5th Left ICS - MCL] : palpated at the 5th LICS in the midclavicular line [Normal] : normal [No Precordial Heave] : no precordial heave was noted [Normal Rate] : normal [Rhythm Regular] : regular [Normal S1] : normal S1 [Normal S2] : normal S2 [No Gallop] : no gallop heard [S3] : no S3 [S4] : no S4 [II] : a grade 2 [No Pitting Edema] : no pitting edema present [Right Carotid Bruit] : no bruit heard over the right carotid [Left Carotid Bruit] : no bruit heard over the left carotid [Right Femoral Bruit] : no bruit heard over the right femoral artery [Left Femoral Bruit] : no bruit heard over the left femoral artery [2+] : left 2+ [No Abnormalities] : the abdominal aorta was not enlarged and no bruit was heard [Clear Lung Fields] : clear lung fields [Good Air Entry] : good air entry [No Respiratory Distress] : no respiratory distress  [Soft] : abdomen soft [Non Tender] : non-tender [No Masses/organomegaly] : no masses/organomegaly [Normal Bowel Sounds] : normal bowel sounds [Normal Gait] : normal gait [No Edema] : no edema [No Cyanosis] : no cyanosis [No Clubbing] : no clubbing [No Varicosities] : no varicosities [No Rash] : no rash [No Skin Lesions] : no skin lesions [Moves all extremities] : moves all extremities [No Focal Deficits] : no focal deficits [Normal Speech] : normal speech [Alert and Oriented] : alert and oriented [Normal memory] : normal memory

## 2024-02-09 NOTE — DISCUSSION/SUMMARY
[FreeTextEntry1] : History taken with the assistance of a . This is an 83-year-old male with past medical history significant for hypertension, hyperlipidemia, history of transient ischemic attack and stroke, Parkinson's disease, who comes in for cardiac consultation. He denies chest pain, shortness of breath, dizziness or syncope. Cardiac risk factors include hypertension and hyperlipidemia. The patient has been on multiple medications for hypertension.  He is currently taking amlodipine 5 mg each morning, Lipitor 20 mg/day aspirin 81 mg/day, Sinemet Aricept. He walks with the assistance of a cane. Electrocardiogram done February 9, 2024 demonstrates normal sinus rhythm at a rate of 66 bpm and is otherwise remarkable for left axis deviation, left anterior hemiblock with artifact secondary to his Parkinson's disease. We are unable to measure an accurate O2 saturation. The patient will start on telmisartan/hydrochlorothiazide 80/12.5 mg each morning and move his amlodipine to 5 mg after dinner. He will follow-up in 4 to 6 weeks to recheck his blood pressure. He will also schedule an echo Doppler examination to evaluate his murmur, left ventricular function, chamber size, and rule out hypertrophy. He is instructed to follow-up with his primary care physician and neurologist. He is currently hemodynamically stable and asymptomatic from a cardiac standpoint.

## 2024-02-12 ENCOUNTER — NON-APPOINTMENT (OUTPATIENT)
Age: 84
End: 2024-02-12

## 2024-02-14 ENCOUNTER — TRANSCRIPTION ENCOUNTER (OUTPATIENT)
Age: 84
End: 2024-02-14

## 2024-02-14 RX ORDER — LOSARTAN POTASSIUM 100 MG/1
100 TABLET, FILM COATED ORAL
Qty: 90 | Refills: 3 | Status: DISCONTINUED | COMMUNITY
Start: 2023-05-10 | End: 2024-02-14

## 2024-02-15 RX ORDER — TELMISARTAN AND HYDROCHLOROTHIAZIDE 80; 12.5 MG/1; MG/1
80-12.5 TABLET ORAL DAILY
Qty: 90 | Refills: 1 | Status: DISCONTINUED | COMMUNITY
Start: 2024-02-09 | End: 2024-02-15

## 2024-02-20 ENCOUNTER — TRANSCRIPTION ENCOUNTER (OUTPATIENT)
Age: 84
End: 2024-02-20

## 2024-02-23 RX ORDER — POLYETHYLENE GLYCOL 3350 17 G/17G
17 POWDER, FOR SOLUTION ORAL
Qty: 90 | Refills: 1 | Status: DISCONTINUED | COMMUNITY
Start: 2023-09-14 | End: 2024-02-23

## 2024-02-28 ENCOUNTER — APPOINTMENT (OUTPATIENT)
Dept: NEUROLOGY | Facility: CLINIC | Age: 84
End: 2024-02-28
Payer: MEDICARE

## 2024-02-28 VITALS — DIASTOLIC BLOOD PRESSURE: 67 MMHG | HEART RATE: 74 BPM | SYSTOLIC BLOOD PRESSURE: 100 MMHG

## 2024-02-28 VITALS
DIASTOLIC BLOOD PRESSURE: 65 MMHG | SYSTOLIC BLOOD PRESSURE: 87 MMHG | WEIGHT: 152 LBS | HEIGHT: 67 IN | BODY MASS INDEX: 23.86 KG/M2 | HEART RATE: 77 BPM

## 2024-02-28 DIAGNOSIS — G20.A1 PARKINSON'S DISEASE WITHOUT DYSKINESIA, WITHOUT MENTION OF FLUCTUATIONS: ICD-10-CM

## 2024-02-28 PROCEDURE — G2211 COMPLEX E/M VISIT ADD ON: CPT

## 2024-02-28 PROCEDURE — 99214 OFFICE O/P EST MOD 30 MIN: CPT

## 2024-02-28 RX ORDER — CARBIDOPA AND LEVODOPA 25; 100 MG/1; MG/1
25-100 TABLET ORAL
Qty: 180 | Refills: 3 | Status: ACTIVE | COMMUNITY
Start: 2023-02-07 | End: 1900-01-01

## 2024-02-28 NOTE — DISCUSSION/SUMMARY
[FreeTextEntry1] : This is an 83-year-old right-handed male who presents with chief complaints of Parkinson's disease.  On exam noted to have bilateral resting tremor and bradykinesia right worse than left.  Also has history of short-term memory loss and urinary incontinence.  Had a TIA in 2019 Currently on Sinemet 25/100, twice a day denies any side effects on Sinemet, no wearing off.  Has had improvement in symptoms on increasing Sinemet  Impression-parkinsonism, reports good benefit on Sinemet.  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  At present patient denies chest pain shortness of breath.  States that 2 weeks ago his blood pressure medications were readjusted because he was having very high readings.  He and his family are advised to reach out to his cardiologist and go to the nearest emergency room in case he does not feel good.  Family wishes to monitor him at home and plan to take him to urgent care in case they notice his blood pressure continues to be low Currently he is on a stool softener for constipation.   Continue Sinemet 25/100, 1 tablet twice a day but change timing to 10 AM and 2 PM Continue physical therapy home-based Continue donepezil to 10 mg daily. At last visit suggested adding melatonin.  Patient does not take it.  States that he does not have nightmares anymore Follow-up in 3-4 months All questions addressed and answered

## 2024-02-28 NOTE — HISTORY OF PRESENT ILLNESS
[FreeTextEntry1] : This is an 82-year-old right-handed male who presents with chief complaints of Parkinson's disease.  Patient wishes to establish care here.  At this visit he is accompanied by his granddaughter Maritza.  History is obtained from both of them and from review of records  Patient was diagnosed with Parkinson's disease 2 years ago when he developed right hand tremors and shuffling gait.  He was initiated on Sinemet half a tablet twice a day there has been some reduction in tremor although tremor still persist. He denies having any falls He has moderate loss of short-term memory without any hallucinations.  He is on donepezil 5 mg at bedtime He has some dream enactment where he is picking things off of trees. Sleep is overall okay sometimes he wakes up early in the morning No difficulty swallowing He endorses drooling constipation.  He has urinary incontinence for over 5 years Mood is sometimes off he does not wish to participate in social activities but for the most part does okay He needs help in ADLs such as dressing eating etc.  Review of systems a complete review of systems is performed and is negative except as listed in HPI Past medical history Hypertension, TIA in July 2019 he is on aspirin. Wet macular degeneration Pseudophakia Retinal detachment Osteoarthritis  Interval history October 24, 2023.  Patient presents to follow-up on Parkinson's disease.  At this visit he is accompanied by his granddaughter and home health aide.  He has since the last visit increased Sinemet from half a tablet twice a day to 1 tablet twice a day.  There has been improvement in tremor and ability to walk.  They deny any side effects.  Currently taking it at 10 AM and 12 noon.  He had 1 fall while trying to get into bed.  MRI of the brain done at [facility] in September 2023 showed 2.5 cm extra-axial lesion in the right middle cranial fossa most likely meningioma.  There is mass effect.  There is also moderate to severe chronic microvascular changes mild volume loss.  As per granddaughter they saw neurosurgery and no neurosurgical intervention was recommended.  He continues to have difficulty with short-term memory long-term memory is intact he is on donepezil 5 mg daily.  He is still taking it at night but states that the vivid dreams have resolved.  Denies any side effects on donepezil weight has been stable no diarrhea no lightheadedness  Interval history February 28, 2024.  Patient is accompanied by his home health aide.  His granddaughter Chani Brandt is over the phone.  She states that he is currently on Sinemet 1 tablet twice a day once in the morning and then at bedtime.  No side effects on Sinemet.  He does find that the tremors are more in the evenings.  On February 13 he was at the hospital due to severe constipation and needed to be manually disimpacted.  Since then he does feel a little off and tired.  Also 2 weeks ago his blood pressure medications were readjusted.  Daughter states that at home his blood pressure tends to be quite high in the clinic it was 100 /67.  He is also on donepezil 10 mg daily.

## 2024-02-28 NOTE — DATA REVIEWED
[de-identified] :  MRI of the brain done at [facility] [place] in September 2023 showed 2.5 cm extra-axial lesion in the right middle cranial fossa most likely meningioma.  There is mass effect.  There is also moderate to severe chronic microvascular changes mild volume loss.

## 2024-02-28 NOTE — PHYSICAL EXAM
[FreeTextEntry1] : On exam patient is awake and alert.  Pleasant cooperative with the exam. Facial expression minimally reduced Speech is soft Extraocular movements are intact Strength 5/5  Resting tremor Minimal intermittent grade 1 bilaterally right more prominent than the left Bilateral bradykinesia Hand opening and closing 1 on the right 1 on the left Rapid alternating movements 1 on the right normal on the left Finger taps 1 bilaterally right is slightly slower Has difficulty getting up from the chair, needs 2 attempts Reduced right arm swing while walking Pull test is negative

## 2024-03-04 ENCOUNTER — APPOINTMENT (OUTPATIENT)
Dept: GERIATRICS | Facility: CLINIC | Age: 84
End: 2024-03-04
Payer: MEDICARE

## 2024-03-04 VITALS
TEMPERATURE: 97.6 F | RESPIRATION RATE: 14 BRPM | SYSTOLIC BLOOD PRESSURE: 109 MMHG | HEART RATE: 61 BPM | WEIGHT: 144 LBS | BODY MASS INDEX: 22.55 KG/M2 | DIASTOLIC BLOOD PRESSURE: 72 MMHG | OXYGEN SATURATION: 96 %

## 2024-03-04 PROCEDURE — G2211 COMPLEX E/M VISIT ADD ON: CPT

## 2024-03-04 PROCEDURE — 99214 OFFICE O/P EST MOD 30 MIN: CPT

## 2024-03-04 RX ORDER — CLINDAMYCIN HYDROCHLORIDE 300 MG/1
300 CAPSULE ORAL
Qty: 14 | Refills: 0 | Status: COMPLETED | COMMUNITY
Start: 2024-01-29 | End: 2024-03-04

## 2024-03-04 NOTE — PHYSICAL EXAM
[Alert] : alert [Sclera] : the sclera and conjunctiva were normal [PERRL] : pupils were equal in size, round, and reactive to light [Normal Oral Mucosa] : normal oral mucosa [Normal Appearance] : the appearance of the neck was normal [No Respiratory Distress] : no respiratory distress [No Acc Muscle Use] : no accessory muscle use [Respiration, Rhythm And Depth] : normal respiratory rhythm and effort [Auscultation Breath Sounds / Voice Sounds] : lungs were clear to auscultation bilaterally [Normal S1, S2] : normal S1 and S2 [Heart Rate And Rhythm] : heart rate was normal and rhythm regular [Edema] : edema was not present [Bowel Sounds] : normal bowel sounds [Abdomen Tenderness] : non-tender [Abdomen Soft] : soft [No Spinal Tenderness] : no spinal tenderness [Involuntary Movements] : no involuntary movements were seen [Normal Affect] : the affect was normal [Normal Mood] : the mood was normal [Normal Gait] : abnormal gait [de-identified] : draining abscess with serosanguinous drainage and homer wound erythema on right inguinal area and second draining abscess with serosanguinous drainage and homer wound erythema on right testicle  [de-identified] : uses walker

## 2024-03-04 NOTE — REVIEW OF SYSTEMS
[As Noted in HPI] : as noted in HPI [Fever] : no fever [Chills] : no chills [Eye Pain] : no eye pain [Red Eyes] : eyes not red [Loss Of Hearing] : no hearing loss [Earache] : no earache [Palpitations] : no palpitations [Chest Pain] : no chest pain [Shortness Of Breath] : no shortness of breath [Wheezing] : no wheezing [Cough] : no cough [SOB on Exertion] : no shortness of breath during exertion [Abdominal Pain] : no abdominal pain [Vomiting] : no vomiting [Constipation] : no constipation [Diarrhea] : no diarrhea [Dysuria] : no dysuria [Joint Swelling] : no joint swelling [Dizziness] : no dizziness [Fainting] : no fainting [Anxiety] : no anxiety [Depression] : no depression [FreeTextEntry4] : right nasal polyp removed [de-identified] : abrasions to bilateral LE s/p fall

## 2024-03-04 NOTE — REASON FOR VISIT
[Follow-Up] : a follow-up visit [Family Member] : family member [FreeTextEntry3] : granddaughter, Shelley  [FreeTextEntry1] : recent episode of constipation

## 2024-03-07 ENCOUNTER — APPOINTMENT (OUTPATIENT)
Dept: CARDIOLOGY | Facility: CLINIC | Age: 84
End: 2024-03-07
Payer: MEDICARE

## 2024-03-07 VITALS
BODY MASS INDEX: 23.07 KG/M2 | WEIGHT: 147 LBS | HEIGHT: 67 IN | DIASTOLIC BLOOD PRESSURE: 70 MMHG | TEMPERATURE: 98.2 F | OXYGEN SATURATION: 96 % | SYSTOLIC BLOOD PRESSURE: 115 MMHG | HEART RATE: 69 BPM | RESPIRATION RATE: 16 BRPM

## 2024-03-07 DIAGNOSIS — R01.1 CARDIAC MURMUR, UNSPECIFIED: ICD-10-CM

## 2024-03-07 DIAGNOSIS — Z86.73 PERSONAL HISTORY OF TRANSIENT ISCHEMIC ATTACK (TIA), AND CEREBRAL INFARCTION W/OUT RESIDUAL DEFICITS: ICD-10-CM

## 2024-03-07 DIAGNOSIS — E78.5 HYPERLIPIDEMIA, UNSPECIFIED: ICD-10-CM

## 2024-03-07 PROCEDURE — 99214 OFFICE O/P EST MOD 30 MIN: CPT

## 2024-03-07 PROCEDURE — G2211 COMPLEX E/M VISIT ADD ON: CPT

## 2024-03-07 NOTE — REASON FOR VISIT
[CV Risk Factors and Non-Cardiac Disease] : CV risk factors and non-cardiac disease [Hypertension] : hypertension [FreeTextEntry3] : Dr. Giordano

## 2024-03-07 NOTE — DISCUSSION/SUMMARY
[FreeTextEntry1] : Dr. Herzog-(PRIOR VISIT and PMH WITH Dr. Herzog):  History taken with the assistance of a . This is an 83-year-old male with past medical history significant for hypertension, hyperlipidemia, history of transient ischemic attack and stroke, Parkinson's disease, who comes in for cardiac consultation. He denies chest pain, shortness of breath, dizziness or syncope. Cardiac risk factors include hypertension and hyperlipidemia. The patient has been on multiple medications for hypertension.  He is currently taking amlodipine 5 mg each morning, Lipitor 20 mg/day aspirin 81 mg/day, Sinemet Aricept. He walks with the assistance of a cane. Electrocardiogram done February 9, 2024 demonstrates normal sinus rhythm at a rate of 66 bpm and is otherwise remarkable for left axis deviation, left anterior hemiblock with artifact secondary to his Parkinson's disease. We are unable to measure an accurate O2 saturation. The patient will start on telmisartan/hydrochlorothiazide 80/12.5 mg each morning and move his amlodipine to 5 mg after dinner. He will follow-up in 4 to 6 weeks to recheck his blood pressure. He will also schedule an echo Doppler examination to evaluate his murmur, left ventricular function, chamber size, and rule out hypertrophy. He is instructed to follow-up with his primary care physician and neurologist. He is currently hemodynamically stable and asymptomatic from a cardiac standpoint.

## 2024-03-07 NOTE — ASSESSMENT
[FreeTextEntry1] : This is an 83-year-old male with past medical history significant for hypertension, hyperlipidemia, history of transient ischemic attack and stroke, Parkinson's disease, who comes in for cardiac follow-up evaluation.  Cardiac risk factors include hypertension and hyperlipidemia.  HPI: Here today with his granddaughter who will assist in translation as the patient is primarily Azerbaijani speaking. He is feeling generally well today and denies chest pain, dizziness, heart palpitations, recent episodes of syncope or falls, SOB, or dyspnea at this time. He is here for a blood pressure check after beginning Telmisartan-HCTZ 80-12.5 mg daily.   Current Medications: Amlodipine 5 mg daily, Atorvastatin 20 mg daily, Aspirin 81 mg daily, and Telmisartan-HCTZ 80-12.5 mg daily.    BLOOD PRESSURE: Controlled.    BLOOD WORK: -Lipid panel done February 2024 demonstrated triglyceride 99, cholesterol 150, HDL 81, LDL 51, non-HDL 69, LDL direct 58. -Lipid panel done November 2023 demonstrated triglyceride 106, cholesterol 140, HDL 67, LDL 54, non-HDL 73. -Lipid panel done February 2023 demonstrated triglyceride 153, cholesterol 169, HDL 77, LDL 62, non-HDL 92.   TESTING/REPORTS: -Electrocardiogram done February 9, 2024 demonstrates normal sinus rhythm at a rate of 66 bpm and is otherwise remarkable for left axis deviation, left anterior hemiblock with artifact secondary to his Parkinson's disease.   PLAN: -He will continue his current medications and contact the office if problems arise before next follow-up appointment. -Patient will schedule echocardiogram doppler examination to evaluate murmur, left ventricular function, chamber size, and rule out hypertrophy.    I have discussed the plan of care with TOBIASITZEL RODRÍGUEZ and he will follow up in 3-4 months. They are compliant with all of their medications.     The patient understands that aerobic exercises must be increased to 40 minutes 4 times/week and a detailed discussion of lifestyle modification was done today.   The patient has a good understanding of the diagnosis, treatment plan and lifestyle modification.   They will contact me at the office for any questions with their care or any changes in their health status.   The patient was discussed with supervision physician Dr. Fredo Herzog at the time of the visit and the plan of care will be carried out as noted above.   NOAH Carvalho NP

## 2024-03-07 NOTE — PHYSICAL EXAM
[Well Nourished] : well nourished [Well Developed] : well developed [No Acute Distress] : no acute distress [Normal Conjunctiva] : normal conjunctiva [No Carotid Bruit] : no carotid bruit [Normal Venous Pressure] : normal venous pressure [Normal S1, S2] : normal S1, S2 [No Rub] : no rub [5th Left ICS - MCL] : palpated at the 5th LICS in the midclavicular line [Normal] : normal [No Precordial Heave] : no precordial heave was noted [Normal Rate] : normal [Rhythm Regular] : regular [Normal S1] : normal S1 [Normal S2] : normal S2 [No Gallop] : no gallop heard [II] : a grade 2 [No Pitting Edema] : no pitting edema present [No Abnormalities] : the abdominal aorta was not enlarged and no bruit was heard [2+] : left 2+ [Clear Lung Fields] : clear lung fields [Good Air Entry] : good air entry [No Respiratory Distress] : no respiratory distress  [Soft] : abdomen soft [Non Tender] : non-tender [No Masses/organomegaly] : no masses/organomegaly [Normal Bowel Sounds] : normal bowel sounds [No Edema] : no edema [Normal Gait] : normal gait [No Cyanosis] : no cyanosis [No Clubbing] : no clubbing [No Varicosities] : no varicosities [No Skin Lesions] : no skin lesions [No Rash] : no rash [Moves all extremities] : moves all extremities [No Focal Deficits] : no focal deficits [Normal Speech] : normal speech [Alert and Oriented] : alert and oriented [Normal memory] : normal memory [S3] : no S3 [S4] : no S4 [Right Carotid Bruit] : no bruit heard over the right carotid [Left Carotid Bruit] : no bruit heard over the left carotid [Right Femoral Bruit] : no bruit heard over the right femoral artery [Left Femoral Bruit] : no bruit heard over the left femoral artery

## 2024-04-24 ENCOUNTER — APPOINTMENT (OUTPATIENT)
Dept: GASTROENTEROLOGY | Facility: CLINIC | Age: 84
End: 2024-04-24
Payer: MEDICARE

## 2024-04-24 VITALS
DIASTOLIC BLOOD PRESSURE: 75 MMHG | WEIGHT: 147 LBS | HEIGHT: 67 IN | BODY MASS INDEX: 23.07 KG/M2 | HEART RATE: 64 BPM | SYSTOLIC BLOOD PRESSURE: 122 MMHG | OXYGEN SATURATION: 97 %

## 2024-04-24 PROCEDURE — 99204 OFFICE O/P NEW MOD 45 MIN: CPT

## 2024-04-24 NOTE — ASSESSMENT
[FreeTextEntry1] : 83M Parkinson's, dementia, and chronic constipation presents to discuss chronic constipation and rare nocturnal FI.  #Constipation #Nocturnal fecal incontinence  - discussed lifestyle changes for the management of constipation, including drinking a minimum of 2 liters/ 64 oz of fluids a day, regular exercise, and increased insoluble fiber (food sources like nuts and seeds, leafy green vegetables, raw carrots, broccoli and/or OTC supplements like Citrucel) - miralax one capful daily - move dulcolax to morning dosing or cease altogether, as this is likely etiology of nocturnal fecal incontinence  RTC 2 months

## 2024-04-24 NOTE — HISTORY OF PRESENT ILLNESS
[FreeTextEntry1] : Mr. Marco Rodríguez is an 84 yo man with Parkinson's, dementia, TCP, TIA, HTN/HLD and chronic constipation who presents to discuss chronic constipation. Accompanied by family member, history obtained from patient and family member. Family member translates for him at his request,  services declined.  They report that he has needed to go to the ED three times in the last 5 years for constipation. He has had to be manually disimpacted in the past. His most recent ED visit was in 2/2024, and a CTAP at that visit showed "rectum is dilated and stool filled measuring 7.5 cm with mild wall thickening, correlate for fecal impaction in stercoral colitis. Mild thickening of the stomach pylorus. Correlate for gastritis. Hepatic steatosis." Also had an AXR with large stool burden. They report that he had a grossly unremarkable EGD/colonoscopy about 8-10 years ago. No anemia, hgb at 2/2024 visit was 16, MCV 93.   He has had constipation for decades; his family member reports he has suffered from constipation for as long as she can remember. Constipation will be exacerbated by eating heavy foods - "if I eat heavy it comes out heavy." He has never had blood in his stool, it is always yellow. He is on daily PO dulcolax taken at night, takes miralax if he has gone more than 2 days without a bowel movement. Reports rare nocturnal FI. After he was disimpacted in the hospital he had diarrhea for a time, but now he is having two bowel movements a day, Collier 1 or 2, although sometimes it is soft.   He does not drink very much fluid and has a low fiber diet. Not very active.  No abdominal pain, nausea/vomiting, heartburn/regurgitation, dysphagia/odynophagia, weight loss. He previously had mild heartburn and regurgitation but this is controlled with famotidine.

## 2024-04-24 NOTE — PHYSICAL EXAM
[No Acute Distress] : no acute distress [Sclera] : the sclera and conjunctiva were normal [No Respiratory Distress] : no respiratory distress [Heart Rate And Rhythm] : heart rate was normal and rhythm regular [Abdomen Tenderness] : non-tender [No Masses] : no abdominal mass palpated [Abdomen Soft] : soft [Oriented To Time, Place, And Person] : oriented to person, place, and time [Rebound Tenderness] : no rebound tenderness

## 2024-05-17 ENCOUNTER — TRANSCRIPTION ENCOUNTER (OUTPATIENT)
Age: 84
End: 2024-05-17

## 2024-06-17 ENCOUNTER — APPOINTMENT (OUTPATIENT)
Dept: GERIATRICS | Facility: CLINIC | Age: 84
End: 2024-06-17
Payer: MEDICARE

## 2024-06-17 VITALS
OXYGEN SATURATION: 97 % | RESPIRATION RATE: 17 BRPM | WEIGHT: 147 LBS | SYSTOLIC BLOOD PRESSURE: 128 MMHG | TEMPERATURE: 97.3 F | HEART RATE: 69 BPM | BODY MASS INDEX: 23.02 KG/M2 | DIASTOLIC BLOOD PRESSURE: 84 MMHG

## 2024-06-17 DIAGNOSIS — I10 ESSENTIAL (PRIMARY) HYPERTENSION: ICD-10-CM

## 2024-06-17 DIAGNOSIS — K59.00 CONSTIPATION, UNSPECIFIED: ICD-10-CM

## 2024-06-17 DIAGNOSIS — K21.9 GASTRO-ESOPHAGEAL REFLUX DISEASE W/OUT ESOPHAGITIS: ICD-10-CM

## 2024-06-17 DIAGNOSIS — R26.81 UNSTEADINESS ON FEET: ICD-10-CM

## 2024-06-17 PROCEDURE — 99214 OFFICE O/P EST MOD 30 MIN: CPT

## 2024-06-17 PROCEDURE — G2211 COMPLEX E/M VISIT ADD ON: CPT

## 2024-06-17 RX ORDER — AMLODIPINE BESYLATE 5 MG/1
5 TABLET ORAL
Qty: 90 | Refills: 3 | Status: DISCONTINUED | COMMUNITY
Start: 2024-01-18 | End: 2024-06-17

## 2024-06-17 RX ORDER — AFLIBERCEPT 40 MG/ML
2 INJECTION, SOLUTION INTRAVITREAL
Refills: 0 | Status: DISCONTINUED | COMMUNITY
End: 2024-06-17

## 2024-06-17 RX ORDER — AFLIBERCEPT 40 MG/ML
2 INJECTION, SOLUTION INTRAVITREAL
Qty: 1 | Refills: 0 | Status: COMPLETED | COMMUNITY
Start: 2024-05-06

## 2024-06-17 NOTE — REVIEW OF SYSTEMS
[As Noted in HPI] : as noted in HPI [Fever] : no fever [Chills] : no chills [Eye Pain] : no eye pain [Red Eyes] : eyes not red [Earache] : no earache [Loss Of Hearing] : no hearing loss [Chest Pain] : no chest pain [Palpitations] : no palpitations [Shortness Of Breath] : no shortness of breath [Wheezing] : no wheezing [Cough] : no cough [SOB on Exertion] : no shortness of breath during exertion [Abdominal Pain] : no abdominal pain [Vomiting] : no vomiting [Constipation] : no constipation [Diarrhea] : no diarrhea [Dysuria] : no dysuria [Joint Swelling] : no joint swelling [Dizziness] : no dizziness [Fainting] : no fainting [Anxiety] : no anxiety [Depression] : no depression [FreeTextEntry4] : right nasal polyp removed [de-identified] : abrasions to bilateral LE s/p fall

## 2024-06-17 NOTE — ASSESSMENT
[FreeTextEntry1] : - reviewed recent blood work and stable - monitor BP  - f/u in 3 months or earlier if needed

## 2024-06-17 NOTE — HISTORY OF PRESENT ILLNESS
[Any fall with injury in past year] : Patient reported fall with injury in the past year [Completely Dependent] : Completely dependent. [Walker] : walker [Smoke Detector] : smoke detector [Carbon Monoxide Detector] : carbon monoxide detector [Wears Seat Belt] : wears seat belt [0] : 2) Feeling down, depressed, or hopeless: Not at all (0) [PHQ-2 Negative - No further assessment needed] : PHQ-2 Negative - No further assessment needed [FreeTextEntry1] : Mr. Rodríguez is a 84 yo M coming from home lives with his wife, family and has a HHA M-F 9hr and on weekend 7 hrs. He is accompanied by his granddaughter who provided collateral history. Ambulates with a cane. 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  Presents today for a follow up visit, since last visit he has been feeling more tired than usual. Also, GD reports BP has been low in the evening with SBP ranging in 90's high 80's. He takes Amlodipine 5mg in the evening around 8PM. He was seen by cardiology and has upcoming Echocardiogram next month.   He continues to take Dulcolax in the evening, he denies any stool incontinence at night. He has not started fiber tablets.   He has seen by neurology and continue to take donepezil 10mg daily. His HR is stable and denies any falls. He has occasional hand tremor.   Patient denies chest pain, shortness of breath, dizziness, abdominal pain, nausea, vomiting, fevers, chills. Has good appetite. Having normal BMs and urination.         [Driving Concerns] : not driving or driving without noted concerns [[id number]] : 0

## 2024-06-17 NOTE — PHYSICAL EXAM
[Alert] : alert [Sclera] : the sclera and conjunctiva were normal [PERRL] : pupils were equal in size, round, and reactive to light [Normal Oral Mucosa] : normal oral mucosa [Normal Appearance] : the appearance of the neck was normal [No Respiratory Distress] : no respiratory distress [No Acc Muscle Use] : no accessory muscle use [Respiration, Rhythm And Depth] : normal respiratory rhythm and effort [Auscultation Breath Sounds / Voice Sounds] : lungs were clear to auscultation bilaterally [Normal S1, S2] : normal S1 and S2 [Heart Rate And Rhythm] : heart rate was normal and rhythm regular [Edema] : edema was not present [Bowel Sounds] : normal bowel sounds [Abdomen Tenderness] : non-tender [Abdomen Soft] : soft [No Spinal Tenderness] : no spinal tenderness [Involuntary Movements] : no involuntary movements were seen [Normal Affect] : the affect was normal [Normal Mood] : the mood was normal [Normal Gait] : abnormal gait [de-identified] : uses walker  [de-identified] : draining abscess with serosanguinous drainage and homer wound erythema on right inguinal area and second draining abscess with serosanguinous drainage and homer wound erythema on right testicle

## 2024-06-17 NOTE — REASON FOR VISIT
[Follow-Up] : a follow-up visit [Family Member] : family member [FreeTextEntry1] : low blood pressure in the evening  [FreeTextEntry3] : granddaughter, Shelley

## 2024-06-19 ENCOUNTER — APPOINTMENT (OUTPATIENT)
Dept: GASTROENTEROLOGY | Facility: CLINIC | Age: 84
End: 2024-06-19
Payer: MEDICARE

## 2024-06-19 VITALS
HEART RATE: 69 BPM | OXYGEN SATURATION: 98 % | WEIGHT: 144 LBS | SYSTOLIC BLOOD PRESSURE: 117 MMHG | DIASTOLIC BLOOD PRESSURE: 68 MMHG | HEIGHT: 67 IN | BODY MASS INDEX: 22.6 KG/M2

## 2024-06-19 PROCEDURE — 99213 OFFICE O/P EST LOW 20 MIN: CPT

## 2024-06-19 NOTE — ASSESSMENT
[FreeTextEntry1] : 83M Parkinson's, dementia, and chronic constipation presents to discuss chronic constipation and rare nocturnal FI.  #Constipation  - nocturnal fecal incontinence has resolved - since dulcolax 5 mg PO once daily is keeping him regular and he is satisfied with his current bowel movements, will continue this regimen - again discussed lifestyle changes for the management of constipation, including drinking a minimum of 2 liters/ 64 oz of fluids a day, regular exercise, and increased insoluble fiber (food sources like nuts and seeds, leafy green vegetables, raw carrots, broccoli and/or OTC supplements like Citrucel) - miralax one capful daily if stools are hard, or discussed trying miralax daily may allow him to lessen need for dulcolax  RTC 4-6 months, sooner PRN

## 2024-06-19 NOTE — REASON FOR VISIT
[Follow-up] : a follow-up of an existing diagnosis [[employer] ] : provided by [employer]   [FreeTextEntry1] : Constipation [Interpreters_IDNumber] : [id number]

## 2024-06-19 NOTE — PHYSICAL EXAM
[No Acute Distress] : no acute distress [Sclera] : the sclera and conjunctiva were normal [No Respiratory Distress] : no respiratory distress [Heart Rate And Rhythm] : heart rate was normal and rhythm regular [Oriented To Time, Place, And Person] : oriented to person, place, and time

## 2024-06-19 NOTE — HISTORY OF PRESENT ILLNESS
[FreeTextEntry1] : Mr. Marco Rodríguez is an 82 yo man with Parkinson's, dementia, TCP, TIA, HTN/HLD and chronic constipation who presents to discuss chronic constipation. Today he is accompanied by Massiel, home attendant; history obtained from patient and attendant. Last seen 4/2024.  At last visit they reported 3 ED visits in the last 5 years for constipation. He has had to be manually disimpacted in the past. His most recent ED visit was in 2/2024, and a CTAP at that visit showed "rectum is dilated and stool filled measuring 7.5 cm with mild wall thickening, correlate for fecal impaction in stercoral colitis. Mild thickening of the stomach pylorus. Correlate for gastritis. Hepatic steatosis." Also had an AXR with large stool burden. They report that he had a grossly unremarkable EGD/colonoscopy about 8-10 years ago. Constipation ongoing for decades and exacerbated by heavy foods. Never blood in the stool. He is on daily PO dulcolax taken at night, takes miralax if he has gone more than 2 days without a bowel movement. After he was disimpacted in the hospital he had diarrhea for a time, but now he is having two bowel movements a day, George 1 or 2, although sometimes it is soft. He does not drink very much fluid and has a low fiber diet. Not very active. Reported some nocturnal FI, which was attributed to dulcolax at night (which he was instructed to stop).  Today they reports that things are going well and he is not struggling with constipation. He is on dulcolax. No longer having fecal accidents. He has a daily bowel movement in the morning when he wakes up. He continues to take the dulcolax at night. He will have abdominal cramping that resolves with a bowel movement. Having one soft bowel movement daily without straining, no blood. He is satsified with this current regimen.  Denies abdominal pain, dysphagia, N/V, heartburn. No other concerns today.

## 2024-07-01 ENCOUNTER — APPOINTMENT (OUTPATIENT)
Dept: OTOLARYNGOLOGY | Facility: CLINIC | Age: 84
End: 2024-07-01
Payer: MEDICARE

## 2024-07-01 VITALS — SYSTOLIC BLOOD PRESSURE: 114 MMHG | HEART RATE: 61 BPM | DIASTOLIC BLOOD PRESSURE: 77 MMHG | TEMPERATURE: 98.4 F

## 2024-07-01 DIAGNOSIS — J34.89 OTHER SPECIFIED DISORDERS OF NOSE AND NASAL SINUSES: ICD-10-CM

## 2024-07-01 DIAGNOSIS — J33.9 NASAL POLYP, UNSPECIFIED: ICD-10-CM

## 2024-07-01 PROCEDURE — 31231 NASAL ENDOSCOPY DX: CPT

## 2024-07-01 PROCEDURE — 99213 OFFICE O/P EST LOW 20 MIN: CPT | Mod: 25

## 2024-07-02 ENCOUNTER — TRANSCRIPTION ENCOUNTER (OUTPATIENT)
Age: 84
End: 2024-07-02

## 2024-07-08 ENCOUNTER — APPOINTMENT (OUTPATIENT)
Dept: CARDIOLOGY | Facility: CLINIC | Age: 84
End: 2024-07-08
Payer: MEDICARE

## 2024-07-08 VITALS
DIASTOLIC BLOOD PRESSURE: 75 MMHG | HEART RATE: 57 BPM | HEIGHT: 67 IN | SYSTOLIC BLOOD PRESSURE: 118 MMHG | OXYGEN SATURATION: 86 % | RESPIRATION RATE: 16 BRPM | BODY MASS INDEX: 23.23 KG/M2 | WEIGHT: 148 LBS | TEMPERATURE: 98.2 F

## 2024-07-08 DIAGNOSIS — Z86.73 PERSONAL HISTORY OF TRANSIENT ISCHEMIC ATTACK (TIA), AND CEREBRAL INFARCTION W/OUT RESIDUAL DEFICITS: ICD-10-CM

## 2024-07-08 DIAGNOSIS — R01.1 CARDIAC MURMUR, UNSPECIFIED: ICD-10-CM

## 2024-07-08 DIAGNOSIS — I10 ESSENTIAL (PRIMARY) HYPERTENSION: ICD-10-CM

## 2024-07-08 DIAGNOSIS — E78.5 HYPERLIPIDEMIA, UNSPECIFIED: ICD-10-CM

## 2024-07-08 PROCEDURE — G2211 COMPLEX E/M VISIT ADD ON: CPT

## 2024-07-08 PROCEDURE — 93306 TTE W/DOPPLER COMPLETE: CPT

## 2024-07-08 PROCEDURE — 99214 OFFICE O/P EST MOD 30 MIN: CPT | Mod: 25

## 2024-07-08 PROCEDURE — 99214 OFFICE O/P EST MOD 30 MIN: CPT

## 2024-07-09 ENCOUNTER — APPOINTMENT (OUTPATIENT)
Dept: NEUROLOGY | Facility: CLINIC | Age: 84
End: 2024-07-09
Payer: MEDICARE

## 2024-07-09 VITALS
BODY MASS INDEX: 23.23 KG/M2 | SYSTOLIC BLOOD PRESSURE: 127 MMHG | WEIGHT: 148 LBS | HEART RATE: 56 BPM | DIASTOLIC BLOOD PRESSURE: 83 MMHG | HEIGHT: 67 IN

## 2024-07-09 DIAGNOSIS — G20.A1 PARKINSON'S DISEASE WITHOUT DYSKINESIA, WITHOUT MENTION OF FLUCTUATIONS: ICD-10-CM

## 2024-07-09 PROCEDURE — 99215 OFFICE O/P EST HI 40 MIN: CPT

## 2024-07-09 PROCEDURE — G2211 COMPLEX E/M VISIT ADD ON: CPT

## 2024-08-08 ENCOUNTER — APPOINTMENT (OUTPATIENT)
Dept: DERMATOLOGY | Facility: CLINIC | Age: 84
End: 2024-08-08

## 2024-08-08 PROBLEM — L03.019 INFECTION OF NAIL BED OF FINGER: Status: ACTIVE | Noted: 2024-08-08

## 2024-08-08 PROBLEM — R68.89 NAIL FINDING: Status: ACTIVE | Noted: 2024-08-08

## 2024-08-08 PROCEDURE — 99213 OFFICE O/P EST LOW 20 MIN: CPT

## 2024-08-08 NOTE — ASSESSMENT
[FreeTextEntry1] : #Pseudomonal nail infection--L 1st finger - education, counseling - gentle hand care --Topical gentamicin 0.3% opthalmic soln to aa under nail daily  --Vinegar soaks (1part vinegar to 3 parts warm water for 5 min BID)  RTC if not improving

## 2024-08-08 NOTE — HISTORY OF PRESENT ILLNESS
[FreeTextEntry1] : RPV: nail changes  [de-identified] : 84M, Prydeinig speaking, here for eval of:  #nail changes x 2 weeks Pain around cuticles improving within the past two weeks. Denies inciting trauma, never treated   Interpretor used on ipad.

## 2024-08-08 NOTE — PHYSICAL EXAM
[FreeTextEntry3] : longitudinal nail ridging, with greenish discoloration on lateral nail plate of L first digit; no erythema or swelling of nailfold

## 2024-08-08 NOTE — HISTORY OF PRESENT ILLNESS
[FreeTextEntry1] : RPV: nail changes  [de-identified] : 84M, Lithuanian speaking, here for eval of:  #nail changes x 2 weeks Pain around cuticles improving within the past two weeks. Denies inciting trauma, never treated   Interpretor used on ipad.

## 2024-08-12 ENCOUNTER — TRANSCRIPTION ENCOUNTER (OUTPATIENT)
Age: 84
End: 2024-08-12

## 2024-08-15 ENCOUNTER — APPOINTMENT (OUTPATIENT)
Dept: OTOLARYNGOLOGY | Facility: CLINIC | Age: 84
End: 2024-08-15
Payer: MEDICARE

## 2024-08-15 VITALS — HEART RATE: 64 BPM | SYSTOLIC BLOOD PRESSURE: 110 MMHG | DIASTOLIC BLOOD PRESSURE: 72 MMHG | TEMPERATURE: 98 F

## 2024-08-15 DIAGNOSIS — J34.89 OTHER SPECIFIED DISORDERS OF NOSE AND NASAL SINUSES: ICD-10-CM

## 2024-08-15 DIAGNOSIS — R04.0 EPISTAXIS: ICD-10-CM

## 2024-08-15 DIAGNOSIS — J33.9 NASAL POLYP, UNSPECIFIED: ICD-10-CM

## 2024-08-15 PROCEDURE — 99213 OFFICE O/P EST LOW 20 MIN: CPT | Mod: 25

## 2024-08-15 PROCEDURE — 31238 NSL/SINS NDSC SRG NSL HEMRRG: CPT | Mod: RT

## 2024-08-15 NOTE — PROCEDURE
[FreeTextEntry3] : Procedure performed: endoscopic control of epistaxis right  Pre-op/post-op indication: Epistaxis right Verbal and/or written consent obtained from patient -  bleeding infection, pain, septal perforation, continues sx, scarring, crusting, need for further intervention etc Telescope - Rigid glass telescope #0 Anesthesia and/or vasoconstriction was achieved topically by usin% Lidocaine spray and Afrin The scope was introduced in the nasal passage between the middle and inferior turbinates to exam the inferior portion of the middle meatus and the fontanelle, as well as the maxillary ostia.  Next, the scope was passed medically and posteriorly to the middle turbinates to examine the sphenoethmoid recess and the superior turbinate region. Upon visualization the finders are as follows: Nasal Septum: sigmoidal septal deviation. Possible source of bleeding visualized on right mid septum Bilateral exam showed normal Mucosa of the Inferior Turbinate, Middle Turbinate, and Superior Turbinate; scant Mucous, no polyps or masses; Inferior, Super and Middle Meatus were clear Area of likely bleeding right mid septum - small vascular mass likely hemangioma along R ant septum removed with cup forceps with small cuff of tissue and cauterized with silver nitrate and layered with bacitracin ointment The patient tolerated the procedure well

## 2024-08-15 NOTE — CONSULT LETTER
[Please see my note below.] : Please see my note below. [FreeTextEntry1] : Dear Dr. JUSTINE KWONG  I had the pleasure of evaluating your patient TOBIAS RODRÍGUEZ, thank you for allowing us to participate in their care. please see full note detailing our visit below. If you have any questions, please do not hesitate to call me and I would be happy to discuss further.   Scott Montilla M.D. Attending Physician,   Department of Otolaryngology - Head and Neck Surgery [facility]  Office: [phone] Fax: [phone]

## 2024-08-15 NOTE — HISTORY OF PRESENT ILLNESS
[de-identified] : 83 year old male presents for evaluation of nasal polyp found when he was in the [facility] after a fall on 09/06/23. Gets frequent nosebleeds from both sides of nose. Nosebleeds mostly occur when his blood pressure beomes elevated. Denies stuffy nose, but granddaughter states he blows his nose often. Denies difficulty breathing. Denies using nasal sprays or washes. Denies sinus pressure or infections.  CT scan and MRI were taken at hospital - brought read not images.  CT scan showed 2.0 cm hyperattenuating lesion along right middle cranial fossa most compatible with meningioma.  MRI showed polyoid mucosal thickening of left maxillary sinus.   Patient following up s/p biopsy of R nasal mass. Diong well, bleeding stopped a few days ago and was minimal. Patient aware path came back as benign hemangioma. Denies pressure or discomfort.   Patient following up s/p biopsy of R nasal mass. Doing well, denies bleeding. Patient aware path came back as benign hemangioma. Denies pressure or discomfort. Breathing well. Few months had a nosebleed on the right side, some spotting but quicky stopped.   [FreeTextEntry1] : Patient is following up with bloody nose 2 days ago. States nose bleed was from right side and lasted three days, states Monday it was a lot of blood, about 1/2 a cup consistently throughout the entire day. It slowed down Tuesday and by Wednesday was just spotting.

## 2024-08-15 NOTE — ASSESSMENT
[FreeTextEntry1] : 84 year old male presents for evaluation of nasal polyp. On exam, mass on bilateral nasal wall and anteriorly on the R. Patient was cauterized and nasopore was introduced on the R side after polypectomy and removal of nasal mass. CT scan showed 2.0 cm hyperattenuating lesion along right middle cranial fossa most compatible with meningioma. MRI showed polypoid mucosal thickening of left maxillary sinus, Ct did not see floor of nose but on MRI image review saw the masses, removed in entirely  Now following up s/p biopsy of R nasal mass on 09/21/23. With recurrent right sided epistaxis. On exam, source along R ant septum.  - discussed with pt, granddaughter and aid either continue to monitor or remove remaining hemangioma likely source of bleed with cauterization, they elected for cauterization and repeat removal - tissue biopsy collected 09/21/23 indicated hemangioma - small recurrence will monitor as not bothersome - small 2 mm anterior right septum, similar size today removed and sent for path, tried to remove completely   Epistaxis status post endoscopic cauterization Will give regiment of moisturization and nasal precautions to allow cauterized area to heal and avoid further bleeding. patient was instructed what to do in the case of another bleed - tissue biopsy taken today 08/15/24, will call to discuss results  - follow up in 2-3 months to monitor for any changes

## 2024-08-15 NOTE — PHYSICAL EXAM
[Normal] : no abnormal secretions [de-identified] : lipoma on the L   [de-identified] : NADIYA bautista

## 2024-08-15 NOTE — END OF VISIT
[FreeTextEntry3] : I personally saw and examined the patient in detail. I spoke to YURI Perkins regarding the assessment and plan of care.  I preformed the procedures and I reviewed the above assessment and plan of care, and agree. I have made changes in changes in the body of the note where appropriate.

## 2024-08-16 ENCOUNTER — NON-APPOINTMENT (OUTPATIENT)
Age: 84
End: 2024-08-16

## 2024-08-19 ENCOUNTER — NON-APPOINTMENT (OUTPATIENT)
Age: 84
End: 2024-08-19

## 2024-08-19 LAB — CORE LAB BIOPSY: NORMAL

## 2024-08-22 ENCOUNTER — TRANSCRIPTION ENCOUNTER (OUTPATIENT)
Age: 84
End: 2024-08-22

## 2024-08-29 ENCOUNTER — APPOINTMENT (OUTPATIENT)
Dept: OTOLARYNGOLOGY | Facility: CLINIC | Age: 84
End: 2024-08-29
Payer: MEDICARE

## 2024-08-29 VITALS — DIASTOLIC BLOOD PRESSURE: 80 MMHG | HEART RATE: 58 BPM | SYSTOLIC BLOOD PRESSURE: 120 MMHG

## 2024-08-29 DIAGNOSIS — J34.89 OTHER SPECIFIED DISORDERS OF NOSE AND NASAL SINUSES: ICD-10-CM

## 2024-08-29 DIAGNOSIS — R04.0 EPISTAXIS: ICD-10-CM

## 2024-08-29 PROCEDURE — 99213 OFFICE O/P EST LOW 20 MIN: CPT | Mod: 25

## 2024-08-29 PROCEDURE — 31231 NASAL ENDOSCOPY DX: CPT

## 2024-08-29 NOTE — CONSULT LETTER
[Please see my note below.] : Please see my note below. [FreeTextEntry1] : Dear Dr. JUSTINE KWONG  I had the pleasure of evaluating your patient TOBIAS RODRÍGUEZ, thank you for allowing us to participate in their care. please see full note detailing our visit below. If you have any questions, please do not hesitate to call me and I would be happy to discuss further.   Scott Montilla M.D. Attending Physician,   Department of Otolaryngology - Head and Neck Surgery [facility]  Office: [phone] Fax: [phone]

## 2024-08-29 NOTE — HISTORY OF PRESENT ILLNESS
[de-identified] : 83 year old male presents for evaluation of nasal polyp found when he was in the [facility] after a fall on 09/06/23. Gets frequent nosebleeds from both sides of nose. Nosebleeds mostly occur when his blood pressure beomes elevated. Denies stuffy nose, but granddaughter states he blows his nose often. Denies difficulty breathing. Denies using nasal sprays or washes. Denies sinus pressure or infections.  CT scan and MRI were taken at hospital - brought read not images.  CT scan showed 2.0 cm hyperattenuating lesion along right middle cranial fossa most compatible with meningioma.  MRI showed polyoid mucosal thickening of left maxillary sinus.   Patient following up s/p biopsy of R nasal mass. Diong well, bleeding stopped a few days ago and was minimal. Patient aware path came back as benign hemangioma. Denies pressure or discomfort.   Patient following up s/p biopsy of R nasal mass. Doing well, denies bleeding. Patient aware path came back as benign hemangioma. Denies pressure or discomfort. Breathing well. Few months had a nosebleed on the right side, some spotting but quicky stopped.   [FreeTextEntry1] : patient following up after right nasal cauterization 08/15/24. No bleeding since cauterization. With some discomfort around right nostril.

## 2024-08-29 NOTE — ASSESSMENT
[FreeTextEntry1] : 84 year old male presents for evaluation of nasal polyp. On exam, mass on bilateral nasal wall and anteriorly on the R. Patient was cauterized and nasopore was introduced on the R side after polypectomy and removal of nasal mass. CT scan showed 2.0 cm hyperattenuating lesion along right middle cranial fossa most compatible with meningioma. MRI showed polypoid mucosal thickening of left maxillary sinus, Ct did not see floor of nose but on MRI image review saw the masses, removed in entirely  Now following up s/p biopsy of R nasal mass on 09/21/23, right nasal cauterization 08/15/24. On exam, nasal crusting R.  - tissue biopsy collected 09/21/23 indicated hemangioma - small recurrence will monitor as not bothersome - small 2 mm anterior right septum, similar size last visit removed and sent for path, tried to remove completely  some crusting today, will monitor   - Will continue regiment of moisturization and nasal precautions to allow cauterized area to heal and avoid further bleeding. patient was instructed what to do in the case of another bleed - tissue biopsy taken today 08/15/24, will call to discuss results  - follow up in 1 month to monitor for any changes

## 2024-08-29 NOTE — PHYSICAL EXAM
[Normal] : no abnormal secretions [de-identified] : lipoma on the L   [de-identified] : NADIYA bautista

## 2024-09-03 ENCOUNTER — TRANSCRIPTION ENCOUNTER (OUTPATIENT)
Age: 84
End: 2024-09-03

## 2024-09-16 ENCOUNTER — APPOINTMENT (OUTPATIENT)
Dept: GERIATRICS | Facility: CLINIC | Age: 84
End: 2024-09-16
Payer: MEDICARE

## 2024-09-16 VITALS
WEIGHT: 149 LBS | OXYGEN SATURATION: 99 % | TEMPERATURE: 97.3 F | HEART RATE: 54 BPM | RESPIRATION RATE: 14 BRPM | DIASTOLIC BLOOD PRESSURE: 81 MMHG | SYSTOLIC BLOOD PRESSURE: 122 MMHG | BODY MASS INDEX: 23.34 KG/M2

## 2024-09-16 DIAGNOSIS — K59.00 CONSTIPATION, UNSPECIFIED: ICD-10-CM

## 2024-09-16 DIAGNOSIS — Z23 ENCOUNTER FOR IMMUNIZATION: ICD-10-CM

## 2024-09-16 DIAGNOSIS — I10 ESSENTIAL (PRIMARY) HYPERTENSION: ICD-10-CM

## 2024-09-16 DIAGNOSIS — R26.81 UNSTEADINESS ON FEET: ICD-10-CM

## 2024-09-16 DIAGNOSIS — K21.9 GASTRO-ESOPHAGEAL REFLUX DISEASE W/OUT ESOPHAGITIS: ICD-10-CM

## 2024-09-16 PROCEDURE — G0008: CPT

## 2024-09-16 PROCEDURE — G2211 COMPLEX E/M VISIT ADD ON: CPT | Mod: NC

## 2024-09-16 PROCEDURE — 99214 OFFICE O/P EST MOD 30 MIN: CPT | Mod: 25

## 2024-09-16 PROCEDURE — 90662 IIV NO PRSV INCREASED AG IM: CPT

## 2024-09-16 NOTE — PHYSICAL EXAM
[Alert] : alert [Sclera] : the sclera and conjunctiva were normal [PERRL] : pupils were equal in size, round, and reactive to light [Normal Oral Mucosa] : normal oral mucosa [Normal Appearance] : the appearance of the neck was normal [No Respiratory Distress] : no respiratory distress [No Acc Muscle Use] : no accessory muscle use [Respiration, Rhythm And Depth] : normal respiratory rhythm and effort [Auscultation Breath Sounds / Voice Sounds] : lungs were clear to auscultation bilaterally [Normal S1, S2] : normal S1 and S2 [Heart Rate And Rhythm] : heart rate was normal and rhythm regular [Edema] : edema was not present [Bowel Sounds] : normal bowel sounds [Abdomen Tenderness] : non-tender [Abdomen Soft] : soft [No Spinal Tenderness] : no spinal tenderness [Normal Gait] : abnormal gait [Involuntary Movements] : no involuntary movements were seen [Normal Affect] : the affect was normal [Normal Mood] : the mood was normal [de-identified] : uses walker  [de-identified] : draining abscess with serosanguinous drainage and homer wound erythema on right inguinal area and second draining abscess with serosanguinous drainage and homer wound erythema on right testicle

## 2024-09-16 NOTE — HISTORY OF PRESENT ILLNESS
[FreeTextEntry1] : Mr. Rodríguez is an 85 yo M coming from home lives with his wife, family and has a HHA M-F 9hr and on weekend 7 hrs. He is accompanied by his granddaughter who provided collateral history. Ambulates with a cane. 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  Presents today for a follow up visit, since last visit he has been feeling more tired than usual. Also, GD reports BP has been low in the evening with SBP ranging in 90's high 80's. He takes Amlodipine 5mg in the evening around 8PM. He was seen by cardiology and has upcoming Echocardiogram next month. He has been taking Amlodipine every other day due to fluctuating readings.   He continues to take Dulcolax in the evening, he denies any stool incontinence at night. He has not started fiber tablets.   He has seen by neurology his Carbidopa - Levodopa was adjusted to 4 times a day now and he continues to take donepezil 10mg at night. His HR is stable and denies any falls. He complains of tinnitus after taking Donepezil, his wife and HHA reports he has been more confused recently. He had brain MRI and was referred to neurosurgery for meningioma with mass effect and also referral to endocrinology for the pituitary lesion. Suggested to repeat MRI brain in 1 year.  Patient denies chest pain, shortness of breath, dizziness, abdominal pain, nausea, vomiting, fevers, chills. Has good appetite. Having normal BMs and urination.         [Any fall with injury in past year] : Patient reported fall with injury in the past year [Completely Dependent] : Completely dependent. [Walker] : walker [Smoke Detector] : smoke detector [Carbon Monoxide Detector] : carbon monoxide detector [Driving Concerns] : not driving or driving without noted concerns [Wears Seat Belt] : wears seat belt [0] : 2) Feeling down, depressed, or hopeless: Not at all (0) [PHQ-2 Negative - No further assessment needed] : PHQ-2 Negative - No further assessment needed [ADD3Ocjfw] : 0

## 2024-09-16 NOTE — REVIEW OF SYSTEMS
[Fever] : no fever [Chills] : no chills [Eye Pain] : no eye pain [Red Eyes] : eyes not red [Earache] : no earache [Loss Of Hearing] : no hearing loss [Chest Pain] : no chest pain [Palpitations] : no palpitations [Shortness Of Breath] : no shortness of breath [Wheezing] : no wheezing [Cough] : no cough [SOB on Exertion] : no shortness of breath during exertion [Abdominal Pain] : no abdominal pain [Vomiting] : no vomiting [Constipation] : no constipation [Diarrhea] : no diarrhea [Dysuria] : no dysuria [Joint Swelling] : no joint swelling [As Noted in HPI] : as noted in HPI [Dizziness] : no dizziness [Fainting] : no fainting [Anxiety] : no anxiety [Depression] : no depression [de-identified] : abrasions to bilateral LE s/p fall  [FreeTextEntry4] : right nasal polyp removed

## 2024-09-16 NOTE — ASSESSMENT
[FreeTextEntry1] : - ordered repeat blood work  - monitor BP - influenza vaccine given   - f/u in 3 months or earlier if needed

## 2024-09-17 LAB
ALBUMIN SERPL ELPH-MCNC: 4.8 G/DL
ALP BLD-CCNC: 67 U/L
ALT SERPL-CCNC: 7 U/L
ANION GAP SERPL CALC-SCNC: 11 MMOL/L
AST SERPL-CCNC: 23 U/L
BILIRUB SERPL-MCNC: 1.3 MG/DL
BUN SERPL-MCNC: 16 MG/DL
CALCIUM SERPL-MCNC: 10.1 MG/DL
CHLORIDE SERPL-SCNC: 103 MMOL/L
CO2 SERPL-SCNC: 29 MMOL/L
CREAT SERPL-MCNC: 0.93 MG/DL
EGFR: 80 ML/MIN/1.73M2
ESTIMATED AVERAGE GLUCOSE: 117 MG/DL
GLUCOSE SERPL-MCNC: 92 MG/DL
HBA1C MFR BLD HPLC: 5.7 %
HCT VFR BLD CALC: 45.9 %
HGB BLD-MCNC: 15.6 G/DL
MCHC RBC-ENTMCNC: 31.3 PG
MCHC RBC-ENTMCNC: 34 GM/DL
MCV RBC AUTO: 92.2 FL
PLATELET # BLD AUTO: 151 K/UL
POTASSIUM SERPL-SCNC: 4.7 MMOL/L
PROT SERPL-MCNC: 7.3 G/DL
RBC # BLD: 4.98 M/UL
RBC # FLD: 13 %
SODIUM SERPL-SCNC: 142 MMOL/L
WBC # FLD AUTO: 5.21 K/UL

## 2024-09-25 ENCOUNTER — APPOINTMENT (OUTPATIENT)
Dept: NEUROSURGERY | Facility: CLINIC | Age: 84
End: 2024-09-25
Payer: MEDICARE

## 2024-09-25 VITALS
RESPIRATION RATE: 18 BRPM | WEIGHT: 147 LBS | SYSTOLIC BLOOD PRESSURE: 130 MMHG | HEART RATE: 53 BPM | DIASTOLIC BLOOD PRESSURE: 85 MMHG | BODY MASS INDEX: 23.63 KG/M2 | TEMPERATURE: 97.5 F | OXYGEN SATURATION: 98 % | HEIGHT: 66 IN

## 2024-09-25 DIAGNOSIS — E23.7 DISORDER OF PITUITARY GLAND, UNSPECIFIED: ICD-10-CM

## 2024-09-25 DIAGNOSIS — D32.9 BENIGN NEOPLASM OF MENINGES, UNSPECIFIED: ICD-10-CM

## 2024-09-25 PROCEDURE — 99204 OFFICE O/P NEW MOD 45 MIN: CPT

## 2024-09-25 NOTE — HISTORY OF PRESENT ILLNESS
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 He had a fall 9/2023 and lacerated his head and had imaging revealing meningioma. Referred by Dr. Marichuy Soriano for evaluation of meningioma.  6/22/24 MRI brain w/wo contrast  2.1 x 2.1 x 2.3 cm right middle cranial fossa meningioma with mass effect and mild impression of the adjacent right superior temporal gyrus without parenchymal edema. No acute intracranial pathology. Probable moderate chronic microvascular ischemia of the cerebral white matter, few chronic lacunar infarcts of bilateral frontal centrum semiovale, right putamen, and punctate chronic microhemorrhage of the left frontal centrum semiovale. 3 small (2-3 mm) nonenhancing lesions in the anterior and posterior mid pituitary gland with differential diagnosis of Rathke's cysts or pituitary microadenomas. Correlate with pituitary hormone levels, and if indicated follow-up MRI the brain and pituitary without and with contrast in 12 months or different interval based on clinical indications. Moderate retention cyst of the right maxillary sinus, and mild scattered inflammatory mucosal thickening in the paranasal sinuses, without air-fluid level.  Presents today with home attendant. History obtained by home attendant and patient. Granddaughter on phone. He reports unsteadiness and left sided weakness, ambulates with cane. Has hx of glaucoma, receives injections to L eye every 3 months for the last 2 years.  Denies headaches, acute vision changes.   Neuro: Marichuy Soriano  Christel: Shahla Giordano

## 2024-09-25 NOTE — DATA REVIEWED
[de-identified] : Date of Exam: 06-   EXAM:  MRI BRAIN WITHOUT AND WITH CONTRAST  HISTORY:  Meningioma. MRI surveillance.  TECHNIQUE: Magnetic resonance imaging was performed with axial T1-weighted images, axial, coronal and sagittal FLAIR images, axial fast spin-echo T2-weighted images, diffusion weighted axial images, SWI axial images, and postcontrast axial and coronal T1-weighted images on a 3T MR unit.  IV Contrast:  14 ml of Clariscan was injected from a 15 ml single use vial.  COMPARISON:  None available.  FINDINGS: There is a 2.1 x 2.1 x 2.3 cm (transverse, caudocephalad and anteroposterior dimensions) homogeneously enhancing extra-axial dural based mass in the anterosuperior aspect of the right middle cranial fossa with minimal extension to the anterior cranial fossa consistent with meningioma, with mass effect and impression of the adjacent right superior temporal gyrus without parenchymal edema. There has mild linear dural tail enhancement along the adjacent right frontal convexity. There is no other site of abnormal contrast enhancement of the intra-axial or extra-axial structures. There is no restricted diffusion, blood degradation products, or extra-axial fluid collection.  There are multiple moderate nonspecific T2, FLAIR hyperintensities scattered in the subcortical, deep white matter of bilateral cerebral hemispheres, most consistent with chronic microvascular ischemia, with differential diagnosis of sequela of infectious and less likely inflammatory demyelinating process. There are a few chronic lacunar infarcts of bilateral frontal centrum semiovale, right putamen, and punctate focus of hemosiderin deposition of the left frontal centrum semiovale compatible with chronic microhemorrhage.   There are 3 small nonenhancing foci 3 mm in diameter in the mid anterior pituitary gland and two, 2 mm in the mid posterior pituitary gland with differential diagnosis of Rathke's cysts or pituitary microadenomas. There is no suprasellar extension, deviation of the pituitary stalk.  There is mild dilatation of the lateral and third ventricles, without obstructive hydrocephalus. There is mild diffuse prominence of the supratentorial, infratentorial subarachnoid spaces, compatible with global cerebral volume loss.  Flow-voids are identified in the internal carotid and basilar arteries consistent with their patency.  The visualized mastoid air cells, nasopharynx and orbits are unremarkable. There is a moderate 2.3 cm retention cyst of the inferior left maxillary sinus, and mild scattered mucosal thickening of the right maxillary, left ethmoid and frontal sinuses, without air-fluid level. As mild leftward deviation of the nasal septum.   IMPRESSION: 2.1 x 2.1 x 2.3 cm right middle cranial fossa meningioma with mass effect and mild impression of the adjacent right superior temporal gyrus without parenchymal edema.  No acute intracranial pathology.  Probable moderate chronic microvascular ischemia of the cerebral white matter, few chronic lacunar infarcts of bilateral frontal centrum semiovale, right putamen, and punctate chronic microhemorrhage of the left frontal centrum semiovale.  3 small (2-3 mm) nonenhancing lesions in the anterior and posterior mid pituitary gland with differential diagnosis of Rathke's cysts or pituitary microadenomas. Correlate with pituitary hormone levels, and if indicated follow-up MRI the brain and pituitary without and with contrast in 12 months or different interval based on clinical indications.  Moderate retention cyst of the right maxillary sinus, and mild scattered inflammatory mucosal thickening in the paranasal sinuses, without air-fluid level.  Thank you for the opportunity to participate in the care of this patient.

## 2024-09-25 NOTE — REASON FOR VISIT
[New Patient Visit] : a new patient visit [Referred By: _________] : Patient was referred by IAN [Interpreters_IDNumber] : [id number]

## 2024-09-25 NOTE — PHYSICAL EXAM
[General Appearance - Alert] : alert [General Appearance - In No Acute Distress] : in no acute distress [Oriented to Person] : oriented to person [Oriented to Time] : oriented to time [Cranial Nerves Oculomotor (III)] : extraocular motion intact [Cranial Nerves Accessory (XI - Cranial And Spinal)] : head turning and shoulder shrug symmetric [Cranial Nerves Hypoglossal (XII)] : there was no tongue deviation with protrusion [Motor Tone] : muscle tone was normal in all four extremities [Sclera] : the sclera and conjunctiva were normal [Extraocular Movements] : extraocular movements were intact [Outer Ear] : the ears and nose were normal in appearance [Neck Appearance] : the appearance of the neck was normal [] : no respiratory distress [Skin Color & Pigmentation] : normal skin color and pigmentation [FreeTextEntry8] : ambulates with cane

## 2024-09-25 NOTE — ASSESSMENT
[FreeTextEntry1] : He is the 84 year old patient with a referred history of multiple comorbidities including hypertension, TIA, dementia, Parkinson's, embolus, and prostate cancer. An incidental two-centimeter meningioma without mass effect or edema was observed during a scan after a fall recently. He remains asymptomatic from this.   Given his age and comorbidities and natural history of these tumors, I plan to just monitor the patient's condition and schedule a one-year follow-up MRI if all factors remain stable.  He had some pituitary cysts also of uncertain significance. Will send labs. If normal, will watch also annually.   Dr. D'Amico independently reviewed all available images with patient.  PLAN: -endocrine panel -MRI brain in 1 year -return to clinic after to review   Patient verbalizes understanding of today's discussion and next steps in treatment plan.  Today, my ACP, Latoya Baldwin, was here to observe my evaluation and management services for this patient to be followed going forward.   A total of 45 minutes was spent reviewing the labs, imaging and physical examination of the patient. We discussed the diagnosis, and the plan. The patient's questions were answered. The patient demonstrated an excellent understanding of the plan.

## 2024-09-25 NOTE — DATA REVIEWED
[de-identified] : Date of Exam: 06-   EXAM:  MRI BRAIN WITHOUT AND WITH CONTRAST  HISTORY:  Meningioma. MRI surveillance.  TECHNIQUE: Magnetic resonance imaging was performed with axial T1-weighted images, axial, coronal and sagittal FLAIR images, axial fast spin-echo T2-weighted images, diffusion weighted axial images, SWI axial images, and postcontrast axial and coronal T1-weighted images on a 3T MR unit.  IV Contrast:  14 ml of Clariscan was injected from a 15 ml single use vial.  COMPARISON:  None available.  FINDINGS: There is a 2.1 x 2.1 x 2.3 cm (transverse, caudocephalad and anteroposterior dimensions) homogeneously enhancing extra-axial dural based mass in the anterosuperior aspect of the right middle cranial fossa with minimal extension to the anterior cranial fossa consistent with meningioma, with mass effect and impression of the adjacent right superior temporal gyrus without parenchymal edema. There has mild linear dural tail enhancement along the adjacent right frontal convexity. There is no other site of abnormal contrast enhancement of the intra-axial or extra-axial structures. There is no restricted diffusion, blood degradation products, or extra-axial fluid collection.  There are multiple moderate nonspecific T2, FLAIR hyperintensities scattered in the subcortical, deep white matter of bilateral cerebral hemispheres, most consistent with chronic microvascular ischemia, with differential diagnosis of sequela of infectious and less likely inflammatory demyelinating process. There are a few chronic lacunar infarcts of bilateral frontal centrum semiovale, right putamen, and punctate focus of hemosiderin deposition of the left frontal centrum semiovale compatible with chronic microhemorrhage.   There are 3 small nonenhancing foci 3 mm in diameter in the mid anterior pituitary gland and two, 2 mm in the mid posterior pituitary gland with differential diagnosis of Rathke's cysts or pituitary microadenomas. There is no suprasellar extension, deviation of the pituitary stalk.  There is mild dilatation of the lateral and third ventricles, without obstructive hydrocephalus. There is mild diffuse prominence of the supratentorial, infratentorial subarachnoid spaces, compatible with global cerebral volume loss.  Flow-voids are identified in the internal carotid and basilar arteries consistent with their patency.  The visualized mastoid air cells, nasopharynx and orbits are unremarkable. There is a moderate 2.3 cm retention cyst of the inferior left maxillary sinus, and mild scattered mucosal thickening of the right maxillary, left ethmoid and frontal sinuses, without air-fluid level. As mild leftward deviation of the nasal septum.   IMPRESSION: 2.1 x 2.1 x 2.3 cm right middle cranial fossa meningioma with mass effect and mild impression of the adjacent right superior temporal gyrus without parenchymal edema.  No acute intracranial pathology.  Probable moderate chronic microvascular ischemia of the cerebral white matter, few chronic lacunar infarcts of bilateral frontal centrum semiovale, right putamen, and punctate chronic microhemorrhage of the left frontal centrum semiovale.  3 small (2-3 mm) nonenhancing lesions in the anterior and posterior mid pituitary gland with differential diagnosis of Rathke's cysts or pituitary microadenomas. Correlate with pituitary hormone levels, and if indicated follow-up MRI the brain and pituitary without and with contrast in 12 months or different interval based on clinical indications.  Moderate retention cyst of the right maxillary sinus, and mild scattered inflammatory mucosal thickening in the paranasal sinuses, without air-fluid level.  Thank you for the opportunity to participate in the care of this patient.

## 2024-09-25 NOTE — HISTORY OF PRESENT ILLNESS
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 He had a fall 9/2023 and lacerated his head and had imaging revealing meningioma. Referred by Dr. Marichuy Soriano for evaluation of meningioma.  6/22/24 MRI brain w/wo contrast  2.1 x 2.1 x 2.3 cm right middle cranial fossa meningioma with mass effect and mild impression of the adjacent right superior temporal gyrus without parenchymal edema. No acute intracranial pathology. Probable moderate chronic microvascular ischemia of the cerebral white matter, few chronic lacunar infarcts of bilateral frontal centrum semiovale, right putamen, and punctate chronic microhemorrhage of the left frontal centrum semiovale. 3 small (2-3 mm) nonenhancing lesions in the anterior and posterior mid pituitary gland with differential diagnosis of Rathke's cysts or pituitary microadenomas. Correlate with pituitary hormone levels, and if indicated follow-up MRI the brain and pituitary without and with contrast in 12 months or different interval based on clinical indications. Moderate retention cyst of the right maxillary sinus, and mild scattered inflammatory mucosal thickening in the paranasal sinuses, without air-fluid level.  Presents today with home attendant. History obtained by home attendant and patient. Granddaughter on phone. He reports unsteadiness and left sided weakness, ambulates with cane. Has hx of glaucoma, receives injections to L eye every 3 months for the last 2 years.  Denies headaches, acute vision changes.   Neuro: Marichuy Soriano  Christel: Shahla Giordano

## 2024-09-25 NOTE — REASON FOR VISIT
[New Patient Visit] : a new patient visit [Referred By: _________] : Patient was referred by IAN [Interpreters_IDNumber] : [id number]

## 2024-10-07 ENCOUNTER — APPOINTMENT (OUTPATIENT)
Dept: OTOLARYNGOLOGY | Facility: CLINIC | Age: 84
End: 2024-10-07
Payer: MEDICARE

## 2024-10-07 DIAGNOSIS — R04.0 EPISTAXIS: ICD-10-CM

## 2024-10-07 DIAGNOSIS — J33.9 NASAL POLYP, UNSPECIFIED: ICD-10-CM

## 2024-10-07 DIAGNOSIS — J34.89 OTHER SPECIFIED DISORDERS OF NOSE AND NASAL SINUSES: ICD-10-CM

## 2024-10-07 PROCEDURE — 31238 NSL/SINS NDSC SRG NSL HEMRRG: CPT | Mod: RT

## 2024-10-07 PROCEDURE — 99213 OFFICE O/P EST LOW 20 MIN: CPT | Mod: 25

## 2024-11-13 ENCOUNTER — APPOINTMENT (OUTPATIENT)
Dept: GASTROENTEROLOGY | Facility: CLINIC | Age: 84
End: 2024-11-13
Payer: MEDICARE

## 2024-11-13 VITALS
BODY MASS INDEX: 24.27 KG/M2 | OXYGEN SATURATION: 98 % | DIASTOLIC BLOOD PRESSURE: 80 MMHG | HEIGHT: 66 IN | HEART RATE: 59 BPM | WEIGHT: 151 LBS | SYSTOLIC BLOOD PRESSURE: 136 MMHG

## 2024-11-13 PROCEDURE — 99213 OFFICE O/P EST LOW 20 MIN: CPT

## 2024-11-13 RX ORDER — POLYETHYLENE GLYCOL 3350 17 G/17G
17 POWDER, FOR SOLUTION ORAL DAILY
Qty: 30 | Refills: 1 | Status: ACTIVE | COMMUNITY
Start: 2024-11-13 | End: 1900-01-01

## 2024-11-18 ENCOUNTER — APPOINTMENT (OUTPATIENT)
Dept: CARDIOLOGY | Facility: CLINIC | Age: 84
End: 2024-11-18

## 2024-11-19 ENCOUNTER — APPOINTMENT (OUTPATIENT)
Dept: NEUROLOGY | Facility: CLINIC | Age: 84
End: 2024-11-19
Payer: MEDICARE

## 2024-11-19 VITALS
BODY MASS INDEX: 24.27 KG/M2 | HEIGHT: 66 IN | SYSTOLIC BLOOD PRESSURE: 117 MMHG | DIASTOLIC BLOOD PRESSURE: 72 MMHG | HEART RATE: 86 BPM | WEIGHT: 151 LBS

## 2024-11-19 DIAGNOSIS — G20.A1 PARKINSON'S DISEASE WITHOUT DYSKINESIA, WITHOUT MENTION OF FLUCTUATIONS: ICD-10-CM

## 2024-11-19 PROCEDURE — G2211 COMPLEX E/M VISIT ADD ON: CPT

## 2024-11-19 PROCEDURE — 99214 OFFICE O/P EST MOD 30 MIN: CPT

## 2024-12-09 ENCOUNTER — APPOINTMENT (OUTPATIENT)
Dept: OTOLARYNGOLOGY | Facility: CLINIC | Age: 84
End: 2024-12-09
Payer: MEDICARE

## 2024-12-09 DIAGNOSIS — R04.0 EPISTAXIS: ICD-10-CM

## 2024-12-09 DIAGNOSIS — J34.89 OTHER SPECIFIED DISORDERS OF NOSE AND NASAL SINUSES: ICD-10-CM

## 2024-12-09 DIAGNOSIS — J33.9 NASAL POLYP, UNSPECIFIED: ICD-10-CM

## 2024-12-09 PROCEDURE — 31231 NASAL ENDOSCOPY DX: CPT

## 2024-12-09 PROCEDURE — 99213 OFFICE O/P EST LOW 20 MIN: CPT | Mod: 25

## 2024-12-17 ENCOUNTER — APPOINTMENT (OUTPATIENT)
Dept: GERIATRICS | Facility: CLINIC | Age: 84
End: 2024-12-17
Payer: MEDICARE

## 2024-12-17 VITALS
WEIGHT: 144 LBS | DIASTOLIC BLOOD PRESSURE: 86 MMHG | HEART RATE: 57 BPM | TEMPERATURE: 97.7 F | SYSTOLIC BLOOD PRESSURE: 130 MMHG | RESPIRATION RATE: 15 BRPM | BODY MASS INDEX: 23.24 KG/M2

## 2024-12-17 DIAGNOSIS — I10 ESSENTIAL (PRIMARY) HYPERTENSION: ICD-10-CM

## 2024-12-17 DIAGNOSIS — K59.00 CONSTIPATION, UNSPECIFIED: ICD-10-CM

## 2024-12-17 DIAGNOSIS — K21.9 GASTRO-ESOPHAGEAL REFLUX DISEASE W/OUT ESOPHAGITIS: ICD-10-CM

## 2024-12-17 DIAGNOSIS — R26.81 UNSTEADINESS ON FEET: ICD-10-CM

## 2024-12-17 PROCEDURE — 99214 OFFICE O/P EST MOD 30 MIN: CPT

## 2024-12-17 PROCEDURE — G2211 COMPLEX E/M VISIT ADD ON: CPT

## 2024-12-18 DIAGNOSIS — E87.5 HYPERKALEMIA: ICD-10-CM

## 2024-12-18 LAB
ALBUMIN SERPL ELPH-MCNC: 4.8 G/DL
ALP BLD-CCNC: 75 U/L
ALT SERPL-CCNC: 14 U/L
ANION GAP SERPL CALC-SCNC: 16 MMOL/L
AST SERPL-CCNC: 33 U/L
BILIRUB SERPL-MCNC: 1.4 MG/DL
BUN SERPL-MCNC: 23 MG/DL
CALCIUM SERPL-MCNC: 10.1 MG/DL
CHLORIDE SERPL-SCNC: 103 MMOL/L
CO2 SERPL-SCNC: 26 MMOL/L
CREAT SERPL-MCNC: 0.96 MG/DL
EGFR: 77 ML/MIN/1.73M2
ESTIMATED AVERAGE GLUCOSE: 111 MG/DL
GLUCOSE SERPL-MCNC: 64 MG/DL
HBA1C MFR BLD HPLC: 5.5 %
HCT VFR BLD CALC: 45.1 %
HGB BLD-MCNC: 15.4 G/DL
MCHC RBC-ENTMCNC: 31.3 PG
MCHC RBC-ENTMCNC: 34.1 G/DL
MCV RBC AUTO: 91.7 FL
PLATELET # BLD AUTO: 152 K/UL
POTASSIUM SERPL-SCNC: 6.2 MMOL/L
PROT SERPL-MCNC: 7.6 G/DL
RBC # BLD: 4.92 M/UL
RBC # FLD: 12.9 %
SODIUM SERPL-SCNC: 145 MMOL/L
WBC # FLD AUTO: 5.58 K/UL

## 2024-12-19 ENCOUNTER — LABORATORY RESULT (OUTPATIENT)
Age: 84
End: 2024-12-19

## 2024-12-23 ENCOUNTER — RX RENEWAL (OUTPATIENT)
Age: 84
End: 2024-12-23

## 2025-02-25 ENCOUNTER — APPOINTMENT (OUTPATIENT)
Dept: NEUROLOGY | Facility: CLINIC | Age: 85
End: 2025-02-25
Payer: MEDICARE

## 2025-02-25 VITALS
SYSTOLIC BLOOD PRESSURE: 134 MMHG | HEART RATE: 61 BPM | DIASTOLIC BLOOD PRESSURE: 88 MMHG | BODY MASS INDEX: 23.14 KG/M2 | HEIGHT: 66 IN | WEIGHT: 144 LBS

## 2025-02-25 DIAGNOSIS — G20.A1 PARKINSON'S DISEASE WITHOUT DYSKINESIA, WITHOUT MENTION OF FLUCTUATIONS: ICD-10-CM

## 2025-02-25 PROCEDURE — 99214 OFFICE O/P EST MOD 30 MIN: CPT | Mod: 25

## 2025-02-25 PROCEDURE — 64611 CHEMODENERV SALIV GLANDS: CPT

## 2025-03-10 ENCOUNTER — APPOINTMENT (OUTPATIENT)
Dept: OTOLARYNGOLOGY | Facility: CLINIC | Age: 85
End: 2025-03-10
Payer: MEDICARE

## 2025-03-10 ENCOUNTER — NON-APPOINTMENT (OUTPATIENT)
Age: 85
End: 2025-03-10

## 2025-03-10 DIAGNOSIS — J33.9 NASAL POLYP, UNSPECIFIED: ICD-10-CM

## 2025-03-10 DIAGNOSIS — Z86.73 PERSONAL HISTORY OF TRANSIENT ISCHEMIC ATTACK (TIA), AND CEREBRAL INFARCTION W/OUT RESIDUAL DEFICITS: ICD-10-CM

## 2025-03-10 DIAGNOSIS — R79.89 OTHER SPECIFIED ABNORMAL FINDINGS OF BLOOD CHEMISTRY: ICD-10-CM

## 2025-03-10 PROCEDURE — 31238 NSL/SINS NDSC SRG NSL HEMRRG: CPT | Mod: RT

## 2025-03-10 PROCEDURE — 99214 OFFICE O/P EST MOD 30 MIN: CPT | Mod: 25

## 2025-03-11 RX ORDER — HYDROCHLOROTHIAZIDE 12.5 MG/1
12.5 TABLET ORAL
Qty: 90 | Refills: 1 | Status: ACTIVE | COMMUNITY
Start: 2025-03-11 | End: 1900-01-01

## 2025-03-11 RX ORDER — TELMISARTAN 80 MG/1
80 TABLET ORAL
Qty: 90 | Refills: 1 | Status: ACTIVE | COMMUNITY
Start: 2025-03-11 | End: 1900-01-01

## 2025-03-14 ENCOUNTER — APPOINTMENT (OUTPATIENT)
Dept: OTOLARYNGOLOGY | Facility: CLINIC | Age: 85
End: 2025-03-14
Payer: MEDICARE

## 2025-03-14 PROCEDURE — 99213 OFFICE O/P EST LOW 20 MIN: CPT

## 2025-03-14 PROCEDURE — G2211 COMPLEX E/M VISIT ADD ON: CPT

## 2025-03-17 ENCOUNTER — APPOINTMENT (OUTPATIENT)
Dept: GERIATRICS | Facility: CLINIC | Age: 85
End: 2025-03-17
Payer: MEDICARE

## 2025-03-17 VITALS
TEMPERATURE: 98.5 F | HEIGHT: 66 IN | BODY MASS INDEX: 23.99 KG/M2 | RESPIRATION RATE: 16 BRPM | HEART RATE: 76 BPM | OXYGEN SATURATION: 96 % | SYSTOLIC BLOOD PRESSURE: 126 MMHG | DIASTOLIC BLOOD PRESSURE: 83 MMHG | WEIGHT: 149.25 LBS

## 2025-03-17 DIAGNOSIS — K59.00 CONSTIPATION, UNSPECIFIED: ICD-10-CM

## 2025-03-17 DIAGNOSIS — I10 ESSENTIAL (PRIMARY) HYPERTENSION: ICD-10-CM

## 2025-03-17 DIAGNOSIS — R26.81 UNSTEADINESS ON FEET: ICD-10-CM

## 2025-03-17 PROCEDURE — 99214 OFFICE O/P EST MOD 30 MIN: CPT

## 2025-03-17 PROCEDURE — G2211 COMPLEX E/M VISIT ADD ON: CPT

## 2025-03-20 ENCOUNTER — APPOINTMENT (OUTPATIENT)
Dept: OTOLARYNGOLOGY | Facility: CLINIC | Age: 85
End: 2025-03-20
Payer: MEDICARE

## 2025-03-20 DIAGNOSIS — K21.9 GASTRO-ESOPHAGEAL REFLUX DISEASE W/OUT ESOPHAGITIS: ICD-10-CM

## 2025-03-20 DIAGNOSIS — R04.0 EPISTAXIS: ICD-10-CM

## 2025-03-20 PROCEDURE — 99213 OFFICE O/P EST LOW 20 MIN: CPT | Mod: 25

## 2025-04-25 ENCOUNTER — TRANSCRIPTION ENCOUNTER (OUTPATIENT)
Age: 85
End: 2025-04-25

## 2025-04-28 ENCOUNTER — APPOINTMENT (OUTPATIENT)
Dept: GASTROENTEROLOGY | Facility: CLINIC | Age: 85
End: 2025-04-28
Payer: MEDICARE

## 2025-04-28 VITALS
DIASTOLIC BLOOD PRESSURE: 64 MMHG | HEIGHT: 66 IN | RESPIRATION RATE: 16 BRPM | BODY MASS INDEX: 23.14 KG/M2 | WEIGHT: 144 LBS | OXYGEN SATURATION: 91 % | HEART RATE: 53 BPM | SYSTOLIC BLOOD PRESSURE: 117 MMHG

## 2025-04-28 DIAGNOSIS — K59.00 CONSTIPATION, UNSPECIFIED: ICD-10-CM

## 2025-04-28 PROCEDURE — 99214 OFFICE O/P EST MOD 30 MIN: CPT

## 2025-05-05 ENCOUNTER — APPOINTMENT (OUTPATIENT)
Dept: GASTROENTEROLOGY | Facility: CLINIC | Age: 85
End: 2025-05-05

## 2025-05-09 ENCOUNTER — APPOINTMENT (OUTPATIENT)
Dept: NEUROLOGY | Facility: CLINIC | Age: 85
End: 2025-05-09

## 2025-05-16 ENCOUNTER — EMERGENCY (EMERGENCY)
Facility: HOSPITAL | Age: 85
LOS: 1 days | End: 2025-05-16
Attending: STUDENT IN AN ORGANIZED HEALTH CARE EDUCATION/TRAINING PROGRAM
Payer: COMMERCIAL

## 2025-05-16 VITALS
HEART RATE: 54 BPM | HEIGHT: 65.75 IN | DIASTOLIC BLOOD PRESSURE: 97 MMHG | SYSTOLIC BLOOD PRESSURE: 155 MMHG | OXYGEN SATURATION: 98 % | WEIGHT: 147.27 LBS | TEMPERATURE: 98 F | RESPIRATION RATE: 18 BRPM

## 2025-05-16 VITALS
HEART RATE: 64 BPM | DIASTOLIC BLOOD PRESSURE: 94 MMHG | RESPIRATION RATE: 18 BRPM | SYSTOLIC BLOOD PRESSURE: 152 MMHG | TEMPERATURE: 97 F | OXYGEN SATURATION: 99 %

## 2025-05-16 LAB
ALBUMIN SERPL ELPH-MCNC: 3.9 G/DL — SIGNIFICANT CHANGE UP (ref 3.5–5)
ALP SERPL-CCNC: 67 U/L — SIGNIFICANT CHANGE UP (ref 40–120)
ALT FLD-CCNC: 18 U/L DA — SIGNIFICANT CHANGE UP (ref 10–60)
ANION GAP SERPL CALC-SCNC: 0 MMOL/L — LOW (ref 5–17)
APTT BLD: 30.7 SEC — SIGNIFICANT CHANGE UP (ref 26.1–36.8)
AST SERPL-CCNC: 35 U/L — SIGNIFICANT CHANGE UP (ref 10–40)
BASOPHILS # BLD AUTO: 0.03 K/UL — SIGNIFICANT CHANGE UP (ref 0–0.2)
BASOPHILS NFR BLD AUTO: 0.6 % — SIGNIFICANT CHANGE UP (ref 0–2)
BILIRUB SERPL-MCNC: 1.3 MG/DL — HIGH (ref 0.2–1.2)
BUN SERPL-MCNC: 16 MG/DL — SIGNIFICANT CHANGE UP (ref 7–18)
CALCIUM SERPL-MCNC: 9.8 MG/DL — SIGNIFICANT CHANGE UP (ref 8.4–10.5)
CHLORIDE SERPL-SCNC: 106 MMOL/L — SIGNIFICANT CHANGE UP (ref 96–108)
CO2 SERPL-SCNC: 32 MMOL/L — HIGH (ref 22–31)
CREAT SERPL-MCNC: 1.06 MG/DL — SIGNIFICANT CHANGE UP (ref 0.5–1.3)
EGFR: 68 ML/MIN/1.73M2 — SIGNIFICANT CHANGE UP
EGFR: 68 ML/MIN/1.73M2 — SIGNIFICANT CHANGE UP
EOSINOPHIL # BLD AUTO: 0.15 K/UL — SIGNIFICANT CHANGE UP (ref 0–0.5)
EOSINOPHIL NFR BLD AUTO: 2.9 % — SIGNIFICANT CHANGE UP (ref 0–6)
GLUCOSE SERPL-MCNC: 86 MG/DL — SIGNIFICANT CHANGE UP (ref 70–99)
HCT VFR BLD CALC: 44.5 % — SIGNIFICANT CHANGE UP (ref 39–50)
HGB BLD-MCNC: 15.6 G/DL — SIGNIFICANT CHANGE UP (ref 13–17)
IMM GRANULOCYTES NFR BLD AUTO: 0.4 % — SIGNIFICANT CHANGE UP (ref 0–0.9)
INR BLD: 1.03 RATIO — SIGNIFICANT CHANGE UP (ref 0.85–1.16)
LYMPHOCYTES # BLD AUTO: 1.79 K/UL — SIGNIFICANT CHANGE UP (ref 1–3.3)
LYMPHOCYTES # BLD AUTO: 34 % — SIGNIFICANT CHANGE UP (ref 13–44)
MCHC RBC-ENTMCNC: 32.6 PG — SIGNIFICANT CHANGE UP (ref 27–34)
MCHC RBC-ENTMCNC: 35.1 G/DL — SIGNIFICANT CHANGE UP (ref 32–36)
MCV RBC AUTO: 92.9 FL — SIGNIFICANT CHANGE UP (ref 80–100)
MONOCYTES # BLD AUTO: 0.61 K/UL — SIGNIFICANT CHANGE UP (ref 0–0.9)
MONOCYTES NFR BLD AUTO: 11.6 % — SIGNIFICANT CHANGE UP (ref 2–14)
NEUTROPHILS # BLD AUTO: 2.66 K/UL — SIGNIFICANT CHANGE UP (ref 1.8–7.4)
NEUTROPHILS NFR BLD AUTO: 50.5 % — SIGNIFICANT CHANGE UP (ref 43–77)
NRBC BLD AUTO-RTO: 0 /100 WBCS — SIGNIFICANT CHANGE UP (ref 0–0)
PLATELET # BLD AUTO: 136 K/UL — LOW (ref 150–400)
POTASSIUM SERPL-MCNC: 5.4 MMOL/L — HIGH (ref 3.5–5.3)
POTASSIUM SERPL-SCNC: 5.4 MMOL/L — HIGH (ref 3.5–5.3)
PROT SERPL-MCNC: 7.6 G/DL — SIGNIFICANT CHANGE UP (ref 6–8.3)
PROTHROM AB SERPL-ACNC: 12.1 SEC — SIGNIFICANT CHANGE UP (ref 9.9–13.4)
RBC # BLD: 4.79 M/UL — SIGNIFICANT CHANGE UP (ref 4.2–5.8)
RBC # FLD: 12.3 % — SIGNIFICANT CHANGE UP (ref 10.3–14.5)
SODIUM SERPL-SCNC: 138 MMOL/L — SIGNIFICANT CHANGE UP (ref 135–145)
WBC # BLD: 5.26 K/UL — SIGNIFICANT CHANGE UP (ref 3.8–10.5)
WBC # FLD AUTO: 5.26 K/UL — SIGNIFICANT CHANGE UP (ref 3.8–10.5)

## 2025-05-16 RX ORDER — SODIUM CHLORIDE 9 G/1000ML
1000 INJECTION, SOLUTION INTRAVENOUS ONCE
Refills: 0 | Status: COMPLETED | OUTPATIENT
Start: 2025-05-16 | End: 2025-05-16

## 2025-05-16 RX ORDER — SODIUM ZIRCONIUM CYCLOSILICATE 5 G/5G
10 POWDER, FOR SUSPENSION ORAL ONCE
Refills: 0 | Status: COMPLETED | OUTPATIENT
Start: 2025-05-16 | End: 2025-05-16

## 2025-05-16 RX ADMIN — SODIUM ZIRCONIUM CYCLOSILICATE 10 GRAM(S): 5 POWDER, FOR SUSPENSION ORAL at 17:23

## 2025-05-16 RX ADMIN — SODIUM CHLORIDE 1000 MILLILITER(S): 9 INJECTION, SOLUTION INTRAVENOUS at 19:48

## 2025-05-16 RX ADMIN — SODIUM CHLORIDE 1000 MILLILITER(S): 9 INJECTION, SOLUTION INTRAVENOUS at 17:23

## 2025-05-16 NOTE — ED PROVIDER NOTE - PROGRESS NOTE DETAILS
Patrice Brooke, ED attending: Patient remains well-appearing, ate full dinner tray, completed 1 L LR bolus.  Also took Lokelma 10 mg.  EKG did not show any findings concerning for hyperkalemia.  Patient is stable for discharge home with outpatient

## 2025-05-16 NOTE — ED PROVIDER NOTE - PATIENT PORTAL LINK FT
You can access the FollowMyHealth Patient Portal offered by [facility] by registering at the following website: [email or web address]. By joining [email or web address]’s FollowMyHealth portal, you will also be able to view your health information using other applications (apps) compatible with our system.

## 2025-05-16 NOTE — ED PROVIDER NOTE - OBJECTIVE STATEMENT
84-year-old male with past medical history of dementia, hyperlipidemia, on daily aspirin 81 mg, hypertension, GERD, sent to ED for evaluation for rectal bleeding.  Per patient's granddaughter David Baum [phone]  at bedside, she was told that he had been having scant rectal bright red blood per rectum over the last week.  Currently patient denies any rectal bleeding.  He denies any abdominal pain, nausea, vomiting, chest pain, shortness of breath, or malaise.

## 2025-05-16 NOTE — ED PROVIDER NOTE - NSFOLLOWUPINSTRUCTIONS_ED_ALL_ED_FT
## Discharge Instructions: Rectal Bleeding    You were seen in the Emergency Room today for rectal bleeding. Based on our examination, the bleeding is most likely due to hemorrhoids.  These instructions provide guidance on managing your symptoms and following up with a gastroenterologist.    **Recommendations:**    * **Follow-up with Gastroenterology:** It's important to schedule an appointment with a gastroenterologist for further evaluation and management of your rectal bleeding.  Please contact a gastroenterology clinic to schedule an appointment as soon as possible.  You may also contact your primary care physician for a referral.  * **Symptom Management:**      * **High-Fiber Diet:** Increase your intake of fiber-rich foods like fruits, vegetables, and whole grains.  This helps soften stools and prevent straining.      * **Hydration:** Drink plenty of fluids, especially water, to help prevent constipation.      * **Sitz Baths:** Taking warm sitz baths for 15-20 minutes several times a day can help soothe the affected area and relieve discomfort.      * **Over-the-counter Hemorrhoid Creams or Suppositories:**  You may use over-the-counter hemorrhoid creams or suppositories containing hydrocortisone or witch hazel to help relieve pain, itching, and swelling. Follow the instructions on the packaging.      * **Stool Softeners:** Over-the-counter stool softeners (such as docusate sodium) may be helpful to prevent constipation and straining.    **What to Watch For:**    Return to the Emergency Room immediately if you experience any of the following:    * **Heavy or persistent rectal bleeding.**  * **Dizziness or lightheadedness.**  * **Severe abdominal pain.**  * **Fever.**  * **Changes in bowel habits (such as persistent constipation or diarrhea).**  * **Black or tarry stools.**    **Please do not hesitate to contact us if you have any questions or concerns.**

## 2025-05-16 NOTE — ED ADULT NURSE NOTE - OBJECTIVE STATEMENT
Patient presented in ED c/o rectal bleeding at home noticed by her wife as per the patient. Patient said he is feeling well, denies pain/discomfort. Patient denies weakness.  Patient has no active bleeding upon assessment.

## 2025-05-16 NOTE — ED PROVIDER NOTE - CLINICAL SUMMARY MEDICAL DECISION MAKING FREE TEXT BOX
Patient's rectal exam shows multiple external hemorrhoids but normal soft light brown stool.  Vitals are within normal limits.  Patient abdomen is soft, nontender, nondistended.  Will check CBC and chemistry to evaluate for any anemia.  Per my discussion with the patient granddaughter, if there is no significant anemia, given that he is not having any major bleeding, likely can be discharged for outpatient follow-up with gastroenterology.

## 2025-05-16 NOTE — ED PROVIDER NOTE - PHYSICAL EXAMINATION
Physical Exam  GEN: Alert and oriented x 3, in no acute distress, speaking full clear sentences  HEENT: NC/AT, PERRL, EOMI, normal oropharynx  NECK: Supple, nontender, FROM  CV: RRR, no m/r/g  PULM: CTA bilat, no wheezing/rales/rhonchi  ABD: Soft, nontender, nondistended. No organomegaly  RECTAL:   Multiple soft external hemorrhoids, no active bleeding, digital rectal exam reveals normal soft light brown stool, no BRB, no melena.  EXTR: FROM to all extremities, nontender, no edema  SKIN: Warm, dry, no rash  NEURO: AOx3, speaking full clear sentences, CARMONA 5/5 strength, ambulating with stable gait

## 2025-05-16 NOTE — ED PROVIDER NOTE - NSFOLLOWUPCLINICS_GEN_ALL_ED_FT
[facility]  Gastroenterology  [address] [place] [address]  Phone: [phone]  Fax: [phone]

## 2025-05-16 NOTE — ED ADULT NURSE NOTE - NSFALLHARMRISKINTERV_ED_ALL_ED

## 2025-05-23 ENCOUNTER — APPOINTMENT (OUTPATIENT)
Dept: GASTROENTEROLOGY | Facility: CLINIC | Age: 85
End: 2025-05-23
Payer: MEDICARE

## 2025-05-23 VITALS
HEART RATE: 56 BPM | BODY MASS INDEX: 23.14 KG/M2 | TEMPERATURE: 98.7 F | HEIGHT: 66 IN | OXYGEN SATURATION: 95 % | SYSTOLIC BLOOD PRESSURE: 135 MMHG | DIASTOLIC BLOOD PRESSURE: 86 MMHG | WEIGHT: 144 LBS

## 2025-05-23 DIAGNOSIS — Z86.79 PERSONAL HISTORY OF OTHER DISEASES OF THE CIRCULATORY SYSTEM: ICD-10-CM

## 2025-05-23 DIAGNOSIS — K62.5 HEMORRHAGE OF ANUS AND RECTUM: ICD-10-CM

## 2025-05-23 DIAGNOSIS — Z86.39 PERSONAL HISTORY OF OTHER ENDOCRINE, NUTRITIONAL AND METABOLIC DISEASE: ICD-10-CM

## 2025-05-23 PROCEDURE — 99214 OFFICE O/P EST MOD 30 MIN: CPT

## 2025-05-27 ENCOUNTER — LABORATORY RESULT (OUTPATIENT)
Age: 85
End: 2025-05-27

## 2025-05-27 ENCOUNTER — NON-APPOINTMENT (OUTPATIENT)
Age: 85
End: 2025-05-27

## 2025-05-27 ENCOUNTER — APPOINTMENT (OUTPATIENT)
Dept: CARDIOLOGY | Facility: CLINIC | Age: 85
End: 2025-05-27
Payer: MEDICARE

## 2025-05-27 VITALS
HEIGHT: 66 IN | TEMPERATURE: 98.1 F | DIASTOLIC BLOOD PRESSURE: 72 MMHG | HEART RATE: 51 BPM | SYSTOLIC BLOOD PRESSURE: 108 MMHG | RESPIRATION RATE: 16 BRPM | BODY MASS INDEX: 23.46 KG/M2 | WEIGHT: 146 LBS | OXYGEN SATURATION: 97 %

## 2025-05-27 DIAGNOSIS — Z86.73 PERSONAL HISTORY OF TRANSIENT ISCHEMIC ATTACK (TIA), AND CEREBRAL INFARCTION W/OUT RESIDUAL DEFICITS: ICD-10-CM

## 2025-05-27 DIAGNOSIS — I10 ESSENTIAL (PRIMARY) HYPERTENSION: ICD-10-CM

## 2025-05-27 DIAGNOSIS — E78.5 HYPERLIPIDEMIA, UNSPECIFIED: ICD-10-CM

## 2025-05-27 DIAGNOSIS — R01.1 CARDIAC MURMUR, UNSPECIFIED: ICD-10-CM

## 2025-05-27 PROCEDURE — 93000 ELECTROCARDIOGRAM COMPLETE: CPT

## 2025-05-27 PROCEDURE — 99214 OFFICE O/P EST MOD 30 MIN: CPT | Mod: 25

## 2025-05-29 RX ORDER — AFLIBERCEPT 40 MG/ML
2 INJECTION, SOLUTION INTRAVITREAL
Refills: 0 | Status: ACTIVE | COMMUNITY

## 2025-05-30 ENCOUNTER — APPOINTMENT (OUTPATIENT)
Dept: NEUROLOGY | Facility: CLINIC | Age: 85
End: 2025-05-30
Payer: MEDICARE

## 2025-05-30 VITALS
DIASTOLIC BLOOD PRESSURE: 81 MMHG | BODY MASS INDEX: 23.46 KG/M2 | HEIGHT: 66 IN | HEART RATE: 58 BPM | SYSTOLIC BLOOD PRESSURE: 128 MMHG | WEIGHT: 146 LBS

## 2025-05-30 DIAGNOSIS — G20.A1 PARKINSON'S DISEASE WITHOUT DYSKINESIA, WITHOUT MENTION OF FLUCTUATIONS: ICD-10-CM

## 2025-05-30 DIAGNOSIS — K11.7 DISTURBANCES OF SALIVARY SECRETION: ICD-10-CM

## 2025-05-30 PROCEDURE — 64611 CHEMODENERV SALIV GLANDS: CPT

## 2025-05-30 PROCEDURE — 99214 OFFICE O/P EST MOD 30 MIN: CPT | Mod: 25

## 2025-06-04 ENCOUNTER — TRANSCRIPTION ENCOUNTER (OUTPATIENT)
Age: 85
End: 2025-06-04

## 2025-06-06 ENCOUNTER — APPOINTMENT (OUTPATIENT)
Age: 85
End: 2025-06-06
Payer: MEDICARE

## 2025-06-06 VITALS — BODY MASS INDEX: 25.39 KG/M2 | WEIGHT: 158 LBS | HEIGHT: 66 IN

## 2025-06-06 PROCEDURE — 99203 OFFICE O/P NEW LOW 30 MIN: CPT | Mod: 25

## 2025-06-06 PROCEDURE — 11721 DEBRIDE NAIL 6 OR MORE: CPT

## 2025-06-09 ENCOUNTER — APPOINTMENT (OUTPATIENT)
Dept: OTOLARYNGOLOGY | Facility: CLINIC | Age: 85
End: 2025-06-09
Payer: MEDICARE

## 2025-06-09 VITALS
HEIGHT: 66 IN | BODY MASS INDEX: 25.39 KG/M2 | SYSTOLIC BLOOD PRESSURE: 137 MMHG | DIASTOLIC BLOOD PRESSURE: 86 MMHG | TEMPERATURE: 98.2 F | WEIGHT: 158 LBS | HEART RATE: 56 BPM

## 2025-06-09 PROCEDURE — 99213 OFFICE O/P EST LOW 20 MIN: CPT | Mod: 25

## 2025-06-09 PROCEDURE — 31231 NASAL ENDOSCOPY DX: CPT

## 2025-06-26 ENCOUNTER — APPOINTMENT (OUTPATIENT)
Dept: OTOLARYNGOLOGY | Facility: CLINIC | Age: 85
End: 2025-06-26
Payer: MEDICARE

## 2025-06-26 PROCEDURE — 99213 OFFICE O/P EST LOW 20 MIN: CPT | Mod: 25

## 2025-06-26 PROCEDURE — 31238 NSL/SINS NDSC SRG NSL HEMRRG: CPT | Mod: RT

## 2025-06-30 ENCOUNTER — APPOINTMENT (OUTPATIENT)
Dept: GERIATRICS | Facility: CLINIC | Age: 85
End: 2025-06-30
Payer: MEDICARE

## 2025-06-30 VITALS
DIASTOLIC BLOOD PRESSURE: 80 MMHG | RESPIRATION RATE: 16 BRPM | SYSTOLIC BLOOD PRESSURE: 119 MMHG | OXYGEN SATURATION: 99 % | HEIGHT: 66 IN | HEART RATE: 61 BPM | WEIGHT: 146.13 LBS | BODY MASS INDEX: 23.48 KG/M2 | TEMPERATURE: 97.9 F

## 2025-06-30 PROCEDURE — 99214 OFFICE O/P EST MOD 30 MIN: CPT

## 2025-06-30 PROCEDURE — G2211 COMPLEX E/M VISIT ADD ON: CPT

## 2025-07-01 PROBLEM — I63.9 CEREBROVASCULAR ACCIDENT (CVA), UNSPECIFIED MECHANISM: Status: ACTIVE | Noted: 2025-07-01

## 2025-07-01 PROBLEM — D80.1 HYPOGAMMAGLOBULINEMIA DUE TO MONOCLONAL GAMMOPATHY OF UNDETERMINED SIGNIFICANCE (MGUS): Status: ACTIVE | Noted: 2025-07-01

## 2025-07-01 LAB
ALBUMIN SERPL ELPH-MCNC: 4.7 G/DL
ALP BLD-CCNC: 74 U/L
ALT SERPL-CCNC: 17 U/L
ANION GAP SERPL CALC-SCNC: 13 MMOL/L
AST SERPL-CCNC: 30 U/L
BILIRUB SERPL-MCNC: 1.7 MG/DL
BUN SERPL-MCNC: 17 MG/DL
CALCIUM SERPL-MCNC: 10.1 MG/DL
CHLORIDE SERPL-SCNC: 103 MMOL/L
CO2 SERPL-SCNC: 25 MMOL/L
CREAT SERPL-MCNC: 0.77 MG/DL
EGFRCR SERPLBLD CKD-EPI 2021: 87 ML/MIN/1.73M2
GLUCOSE SERPL-MCNC: 90 MG/DL
HCT VFR BLD CALC: 44.8 %
HGB BLD-MCNC: 15.6 G/DL
MCHC RBC-ENTMCNC: 32 PG
MCHC RBC-ENTMCNC: 34.8 G/DL
MCV RBC AUTO: 92 FL
PLATELET # BLD AUTO: 128 K/UL
POTASSIUM SERPL-SCNC: 4.3 MMOL/L
PROT SERPL-MCNC: 7.3 G/DL
RBC # BLD: 4.87 M/UL
RBC # FLD: 12.9 %
SODIUM SERPL-SCNC: 142 MMOL/L
WBC # FLD AUTO: 4.8 K/UL

## 2025-07-02 ENCOUNTER — NON-APPOINTMENT (OUTPATIENT)
Age: 85
End: 2025-07-02

## 2025-07-02 LAB — CORE LAB BIOPSY: NORMAL

## 2025-07-07 ENCOUNTER — TRANSCRIPTION ENCOUNTER (OUTPATIENT)
Age: 85
End: 2025-07-07

## 2025-07-07 PROBLEM — R22.1 MASS IN NECK: Status: ACTIVE | Noted: 2025-07-07

## 2025-07-08 ENCOUNTER — TRANSCRIPTION ENCOUNTER (OUTPATIENT)
Age: 85
End: 2025-07-08

## 2025-07-12 ENCOUNTER — APPOINTMENT (OUTPATIENT)
Dept: CARE COORDINATION | Facility: HOME HEALTH | Age: 85
End: 2025-07-12
Payer: MEDICARE

## 2025-07-12 PROCEDURE — 99347 HOME/RES VST EST SF MDM 20: CPT | Mod: 93

## 2025-07-14 PROBLEM — I73.9 PVD (PERIPHERAL VASCULAR DISEASE): Status: ACTIVE | Noted: 2025-06-08

## 2025-08-04 ENCOUNTER — APPOINTMENT (OUTPATIENT)
Dept: OTOLARYNGOLOGY | Facility: CLINIC | Age: 85
End: 2025-08-04
Payer: MEDICARE

## 2025-08-04 VITALS — SYSTOLIC BLOOD PRESSURE: 127 MMHG | HEART RATE: 59 BPM | DIASTOLIC BLOOD PRESSURE: 84 MMHG | TEMPERATURE: 98 F

## 2025-08-04 DIAGNOSIS — D18.01 HEMANGIOMA OF SKIN AND SUBCUTANEOUS TISSUE: ICD-10-CM

## 2025-08-04 DIAGNOSIS — R04.0 EPISTAXIS: ICD-10-CM

## 2025-08-04 PROCEDURE — 99213 OFFICE O/P EST LOW 20 MIN: CPT | Mod: 25

## 2025-08-04 PROCEDURE — 31231 NASAL ENDOSCOPY DX: CPT

## 2025-08-11 ENCOUNTER — APPOINTMENT (OUTPATIENT)
Dept: CARDIOLOGY | Facility: CLINIC | Age: 85
End: 2025-08-11
Payer: MEDICARE

## 2025-08-11 VITALS
DIASTOLIC BLOOD PRESSURE: 78 MMHG | TEMPERATURE: 98.1 F | HEIGHT: 66 IN | SYSTOLIC BLOOD PRESSURE: 128 MMHG | OXYGEN SATURATION: 98 % | RESPIRATION RATE: 16 BRPM | BODY MASS INDEX: 22.98 KG/M2 | WEIGHT: 143 LBS | HEART RATE: 52 BPM

## 2025-08-11 DIAGNOSIS — I10 ESSENTIAL (PRIMARY) HYPERTENSION: ICD-10-CM

## 2025-08-11 DIAGNOSIS — Z86.73 PERSONAL HISTORY OF TRANSIENT ISCHEMIC ATTACK (TIA), AND CEREBRAL INFARCTION W/OUT RESIDUAL DEFICITS: ICD-10-CM

## 2025-08-11 DIAGNOSIS — E78.5 HYPERLIPIDEMIA, UNSPECIFIED: ICD-10-CM

## 2025-08-11 PROCEDURE — 99214 OFFICE O/P EST MOD 30 MIN: CPT

## 2025-08-11 PROCEDURE — G2211 COMPLEX E/M VISIT ADD ON: CPT

## 2025-08-13 ENCOUNTER — OUTPATIENT (OUTPATIENT)
Dept: OUTPATIENT SERVICES | Facility: HOSPITAL | Age: 85
LOS: 1 days | End: 2025-08-13
Payer: MEDICARE

## 2025-08-13 ENCOUNTER — TRANSCRIPTION ENCOUNTER (OUTPATIENT)
Age: 85
End: 2025-08-13

## 2025-08-13 ENCOUNTER — APPOINTMENT (OUTPATIENT)
Dept: GASTROENTEROLOGY | Facility: HOSPITAL | Age: 85
End: 2025-08-13

## 2025-08-13 VITALS
OXYGEN SATURATION: 97 % | DIASTOLIC BLOOD PRESSURE: 91 MMHG | RESPIRATION RATE: 16 BRPM | HEART RATE: 61 BPM | SYSTOLIC BLOOD PRESSURE: 188 MMHG

## 2025-08-13 VITALS
DIASTOLIC BLOOD PRESSURE: 92 MMHG | HEIGHT: 67 IN | OXYGEN SATURATION: 99 % | SYSTOLIC BLOOD PRESSURE: 188 MMHG | TEMPERATURE: 97 F | HEART RATE: 59 BPM | RESPIRATION RATE: 21 BRPM | WEIGHT: 147.93 LBS

## 2025-08-13 DIAGNOSIS — Z90.79 ACQUIRED ABSENCE OF OTHER GENITAL ORGAN(S): Chronic | ICD-10-CM

## 2025-08-13 DIAGNOSIS — K62.5 HEMORRHAGE OF ANUS AND RECTUM: ICD-10-CM

## 2025-08-13 LAB — GLUCOSE BLDC GLUCOMTR-MCNC: 88 MG/DL — SIGNIFICANT CHANGE UP (ref 70–99)

## 2025-08-13 PROCEDURE — 93010 ELECTROCARDIOGRAM REPORT: CPT

## 2025-08-13 PROCEDURE — 45331 SIGMOIDOSCOPY AND BIOPSY: CPT

## 2025-08-13 PROCEDURE — 45330 DIAGNOSTIC SIGMOIDOSCOPY: CPT

## 2025-08-13 PROCEDURE — 93005 ELECTROCARDIOGRAM TRACING: CPT

## 2025-08-13 PROCEDURE — 82962 GLUCOSE BLOOD TEST: CPT

## 2025-08-13 RX ORDER — DORZOLAMIDE HYDROCHLORIDE AND TIMOLOL MALEATE 20; 5 MG/ML; MG/ML
0 SOLUTION/ DROPS OPHTHALMIC
Refills: 0 | DISCHARGE

## 2025-08-13 RX ORDER — OMEPRAZOLE 20 MG/1
1 CAPSULE, DELAYED RELEASE ORAL
Refills: 0 | DISCHARGE

## 2025-08-13 RX ORDER — ASPIRIN 325 MG
1 TABLET ORAL
Refills: 0 | DISCHARGE

## 2025-08-13 RX ORDER — CARBIDOPA/LEVODOPA 25MG-100MG
0 TABLET ORAL
Refills: 0 | DISCHARGE

## 2025-08-13 RX ORDER — ATORVASTATIN CALCIUM 80 MG/1
1 TABLET, FILM COATED ORAL
Refills: 0 | DISCHARGE

## 2025-08-13 RX ORDER — CYST/ALA/Q10/PHOS.SER/DHA/BROC 100-20-50
POWDER (GRAM) ORAL
Refills: 0 | DISCHARGE

## 2025-08-13 RX ADMIN — Medication 30 MILLILITER(S): at 10:18

## 2025-08-18 ENCOUNTER — APPOINTMENT (OUTPATIENT)
Dept: GASTROENTEROLOGY | Facility: CLINIC | Age: 85
End: 2025-08-18

## 2025-08-21 PROBLEM — R01.1 CARDIAC MURMUR, UNSPECIFIED: Chronic | Status: ACTIVE | Noted: 2025-08-13

## 2025-08-21 PROBLEM — K21.9 GASTRO-ESOPHAGEAL REFLUX DISEASE WITHOUT ESOPHAGITIS: Chronic | Status: ACTIVE | Noted: 2025-08-13

## 2025-08-21 PROBLEM — R32 UNSPECIFIED URINARY INCONTINENCE: Chronic | Status: ACTIVE | Noted: 2025-08-13

## 2025-08-21 PROBLEM — G45.9 TRANSIENT CEREBRAL ISCHEMIC ATTACK, UNSPECIFIED: Chronic | Status: ACTIVE | Noted: 2025-08-13

## 2025-08-21 PROBLEM — Z86.69 PERSONAL HISTORY OF OTHER DISEASES OF THE NERVOUS SYSTEM AND SENSE ORGANS: Chronic | Status: ACTIVE | Noted: 2025-08-13

## 2025-08-21 PROBLEM — I10 ESSENTIAL (PRIMARY) HYPERTENSION: Chronic | Status: ACTIVE | Noted: 2025-08-13

## 2025-08-21 PROBLEM — H35.30 UNSPECIFIED MACULAR DEGENERATION: Chronic | Status: ACTIVE | Noted: 2025-08-13

## 2025-08-21 PROBLEM — H40.9 UNSPECIFIED GLAUCOMA: Chronic | Status: ACTIVE | Noted: 2025-08-13

## 2025-08-21 PROBLEM — Z87.898 PERSONAL HISTORY OF OTHER SPECIFIED CONDITIONS: Chronic | Status: ACTIVE | Noted: 2025-08-13

## 2025-08-21 PROBLEM — E78.00 PURE HYPERCHOLESTEROLEMIA, UNSPECIFIED: Chronic | Status: ACTIVE | Noted: 2025-08-13

## 2025-09-02 ENCOUNTER — APPOINTMENT (OUTPATIENT)
Dept: NEUROLOGY | Facility: CLINIC | Age: 85
End: 2025-09-02
Payer: MEDICARE

## 2025-09-02 VITALS
DIASTOLIC BLOOD PRESSURE: 88 MMHG | BODY MASS INDEX: 23.89 KG/M2 | WEIGHT: 148 LBS | HEART RATE: 56 BPM | SYSTOLIC BLOOD PRESSURE: 146 MMHG

## 2025-09-02 DIAGNOSIS — G20.A1 PARKINSON'S DISEASE WITHOUT DYSKINESIA, WITHOUT MENTION OF FLUCTUATIONS: ICD-10-CM

## 2025-09-02 DIAGNOSIS — K11.7 DISTURBANCES OF SALIVARY SECRETION: ICD-10-CM

## 2025-09-02 PROCEDURE — 64611 CHEMODENERV SALIV GLANDS: CPT

## 2025-09-02 PROCEDURE — 99214 OFFICE O/P EST MOD 30 MIN: CPT | Mod: 25

## 2025-09-04 ENCOUNTER — APPOINTMENT (OUTPATIENT)
Dept: MRI IMAGING | Facility: CLINIC | Age: 85
End: 2025-09-04

## 2025-09-04 PROCEDURE — A9585: CPT

## 2025-09-04 PROCEDURE — 70553 MRI BRAIN STEM W/O & W/DYE: CPT

## 2025-09-09 ENCOUNTER — APPOINTMENT (OUTPATIENT)
Dept: CT IMAGING | Facility: CLINIC | Age: 85
End: 2025-09-09

## 2025-09-09 PROCEDURE — 70491 CT SOFT TISSUE NECK W/DYE: CPT

## 2025-09-11 ENCOUNTER — TRANSCRIPTION ENCOUNTER (OUTPATIENT)
Age: 85
End: 2025-09-11

## 2025-09-17 ENCOUNTER — APPOINTMENT (OUTPATIENT)
Dept: NEUROSURGERY | Facility: CLINIC | Age: 85
End: 2025-09-17
Payer: MEDICARE

## 2025-09-17 VITALS
HEIGHT: 66 IN | WEIGHT: 148 LBS | SYSTOLIC BLOOD PRESSURE: 133 MMHG | HEART RATE: 57 BPM | OXYGEN SATURATION: 98 % | BODY MASS INDEX: 23.78 KG/M2 | RESPIRATION RATE: 18 BRPM | DIASTOLIC BLOOD PRESSURE: 93 MMHG

## 2025-09-17 DIAGNOSIS — E23.7 DISORDER OF PITUITARY GLAND, UNSPECIFIED: ICD-10-CM

## 2025-09-17 DIAGNOSIS — D32.9 BENIGN NEOPLASM OF MENINGES, UNSPECIFIED: ICD-10-CM

## 2025-09-17 PROCEDURE — 99213 OFFICE O/P EST LOW 20 MIN: CPT

## 2025-09-18 ENCOUNTER — APPOINTMENT (OUTPATIENT)
Dept: OPHTHALMOLOGY | Facility: CLINIC | Age: 85
End: 2025-09-18
Payer: MEDICARE

## 2025-09-18 ENCOUNTER — NON-APPOINTMENT (OUTPATIENT)
Age: 85
End: 2025-09-18

## 2025-09-18 PROCEDURE — 92004 COMPRE OPH EXAM NEW PT 1/>: CPT | Mod: 25

## 2025-09-18 PROCEDURE — 92134 CPTRZ OPH DX IMG PST SGM RTA: CPT

## 2025-09-18 PROCEDURE — 92015 DETERMINE REFRACTIVE STATE: CPT | Mod: NC

## 2025-09-26 PROBLEM — B35.3 TINEA PEDIS OF BOTH FEET: Status: ACTIVE | Noted: 2025-09-26

## (undated) DEVICE — TUBING CAP SET ENDO 24HR USE GI

## (undated) DEVICE — PACK IV START WITH CHG

## (undated) DEVICE — TUBING SUCTION 20FT

## (undated) DEVICE — FOLEY HOLDER STATLOCK 2 WAY ADULT

## (undated) DEVICE — Device

## (undated) DEVICE — SENSOR O2 FINGER ADULT

## (undated) DEVICE — BIOPSY FORCEP RADIAL JAW 4 STANDARD WITH NEEDLE

## (undated) DEVICE — SOL INJ NS 0.9% 500ML 2 PORT

## (undated) DEVICE — TUBING SUCTION CONN 6FT STERILE

## (undated) DEVICE — SUCTION YANKAUER NO CONTROL VENT

## (undated) DEVICE — CATH IV SAFE BC 22G X 1" (BLUE)

## (undated) DEVICE — CATH IV SAFE BC 20G X 1.16" (PINK)

## (undated) DEVICE — TUBING IV SET GRAVITY 3Y 100" MACRO